# Patient Record
Sex: FEMALE | Race: ASIAN | NOT HISPANIC OR LATINO
[De-identification: names, ages, dates, MRNs, and addresses within clinical notes are randomized per-mention and may not be internally consistent; named-entity substitution may affect disease eponyms.]

---

## 2022-12-12 ENCOUNTER — APPOINTMENT (OUTPATIENT)
Dept: COLORECTAL SURGERY | Facility: CLINIC | Age: 27
End: 2022-12-12

## 2022-12-12 VITALS
SYSTOLIC BLOOD PRESSURE: 112 MMHG | HEIGHT: 61 IN | DIASTOLIC BLOOD PRESSURE: 74 MMHG | WEIGHT: 117 LBS | BODY MASS INDEX: 22.09 KG/M2 | HEART RATE: 83 BPM | TEMPERATURE: 98.2 F

## 2022-12-12 PROBLEM — Z00.00 ENCOUNTER FOR PREVENTIVE HEALTH EXAMINATION: Status: ACTIVE | Noted: 2022-12-12

## 2022-12-12 PROCEDURE — 99204 OFFICE O/P NEW MOD 45 MIN: CPT

## 2022-12-12 NOTE — HISTORY OF PRESENT ILLNESS
[FreeTextEntry1] : 28 y/o Mandarin speaking F presents for evaluation of appendicitis w/ abscess, referred by Dr. Grimm\par Denies PSH\par Never had a colonoscopy\par Denies FMH of CRC or IBD\par \par Pt presented to [facility] in [place], [place] on 12/3/22 c/o 3 days of abdominal pain associated w/ nausea abd some fevers. IV fluids given and started on IV Zosyn. ID and SURG consulted, non surgical.\par WBC mildly elevated at 12.39, neutrophils 81.3%, LFTs ~ 2x ULN (ALT/AST 65/74)\par CT 12/3/22:\par Preliminary read: Extensive inflammatory changes noted in RLQ, adjacent loops demonstrate wall thickening and distention w/ scattered air fluid levels. Wall of cecum thickened as well. There is a central area which appears more cystic within adjacent tubular structure.\par Findings\par Kidneys: Mild fullness of Right renal collecting system, likely r/t distal inflammatory changes\par Bowel: lack of oral contrast limits evaluation of bowel. Findings are suspicious for perforated acute appendicitis w/ multiloculated abscess in RLQ measuring approx 54x40 mm. There is probably 2/2 distal ileal enteritis and ileus of the more proximal ileum\par \par CT a/p completed  12/5/22:\par Kidneys: MIld right sided hydronephrosis is noted 2/2 to pelvic inflammation\par Bowel: Free fluid seen in the lower pelvis. Mild ileus noted. There is phlegmon and/or evolving abscess in the right lower quadrant of of abdomen. Appendix appears markedly abnormal and second measuring approx. 24 mm wall to wall. The size of phlegmon is ~53x50 mm not significantly different than seen previously. There is secondary wall thickening of the cecum and terminal ileum.\par \par Admitted for perforated appendix with intraabdominal abscess, IR consulted as well, no intervention completed. Labs normalized on 12/7 and pt discharged on 12/7/22 on cefdenir 300 mg BID and metronidazole 500 mg TID x 21 days. Advised to repeat CT scan next week. Recommended GEN SURG consult and GI consult for colonoscopy to r/o IBD or intraluminal mass prior to appendectomy\par \par No imaging scheduled.\par Seen by GIA Grimm on 12/9 and referred to this office for further evaluation\par No colonoscopy scheduled per patient\par \par Pt has been taking both medications and reports appetite and energy normal\par Denies abd pain, n/v/c/d or rectal bleeding\par Admits stools have been a bit looser and fall apart\par Drinks only one bottle of water daily\par \par

## 2022-12-12 NOTE — ASSESSMENT
[FreeTextEntry1] : I reviewed with the patient with a Chinese  that her work-up and evaluation is consistent with perforated appendicitis.  However the possibility of of an underlying terminal ileitis/inflammatory bowel disease remains to be considered given the findings of significant inflammation of the cecum and terminal ileum on CAT scan.  These findings of inflammation to be consistent with a secondary involvement from the perforated appendicitis or a primary inflammatory bowel disease.  Therefore, I have recommend that she proceed with a repeat CT scan in 4 weeks to assess resolution of this acute inflammation.  If resolution of the inflammation is identified then a colonoscopy with her primary GI would be appropriate including terminal ileal intubation and evaluation.  The risk, benefits and alternatives of the treatment plan have been outlined.  All questions answered.  After resolution of the inflammation and evaluation with colonoscopy and interval appendectomy will be scheduled.

## 2023-01-03 ENCOUNTER — RESULT REVIEW (OUTPATIENT)
Age: 28
End: 2023-01-03

## 2023-01-11 ENCOUNTER — OUTPATIENT (OUTPATIENT)
Dept: OUTPATIENT SERVICES | Facility: HOSPITAL | Age: 28
LOS: 1 days | End: 2023-01-11

## 2023-01-11 ENCOUNTER — APPOINTMENT (OUTPATIENT)
Dept: CT IMAGING | Facility: CLINIC | Age: 28
End: 2023-01-11
Payer: MEDICAID

## 2023-01-11 PROCEDURE — 74177 CT ABD & PELVIS W/CONTRAST: CPT | Mod: 26

## 2023-01-27 ENCOUNTER — NON-APPOINTMENT (OUTPATIENT)
Age: 28
End: 2023-01-27

## 2023-06-26 ENCOUNTER — NON-APPOINTMENT (OUTPATIENT)
Age: 28
End: 2023-06-26

## 2023-06-27 ENCOUNTER — RESULT REVIEW (OUTPATIENT)
Age: 28
End: 2023-06-27

## 2023-06-28 ENCOUNTER — INPATIENT (INPATIENT)
Facility: HOSPITAL | Age: 28
LOS: 2 days | Discharge: HOME | DRG: 373 | End: 2023-07-01
Attending: SURGERY | Admitting: SURGERY
Payer: COMMERCIAL

## 2023-06-28 ENCOUNTER — OUTPATIENT (OUTPATIENT)
Dept: OUTPATIENT SERVICES | Facility: HOSPITAL | Age: 28
LOS: 1 days | End: 2023-06-28

## 2023-06-28 ENCOUNTER — APPOINTMENT (OUTPATIENT)
Dept: CT IMAGING | Facility: CLINIC | Age: 28
End: 2023-06-28
Payer: COMMERCIAL

## 2023-06-28 VITALS
RESPIRATION RATE: 18 BRPM | DIASTOLIC BLOOD PRESSURE: 69 MMHG | WEIGHT: 115.08 LBS | HEIGHT: 61 IN | OXYGEN SATURATION: 99 % | HEART RATE: 79 BPM | TEMPERATURE: 98 F | SYSTOLIC BLOOD PRESSURE: 106 MMHG

## 2023-06-28 LAB
ALBUMIN SERPL ELPH-MCNC: 3.4 G/DL — SIGNIFICANT CHANGE UP (ref 3.4–5)
ALP SERPL-CCNC: 54 U/L — SIGNIFICANT CHANGE UP (ref 40–120)
ALT FLD-CCNC: 30 U/L — SIGNIFICANT CHANGE UP (ref 12–42)
ANION GAP SERPL CALC-SCNC: 11 MMOL/L — SIGNIFICANT CHANGE UP (ref 5–17)
ANION GAP SERPL CALC-SCNC: 8 MMOL/L — LOW (ref 9–16)
APTT BLD: 23.9 SEC — LOW (ref 27.5–35.5)
APTT BLD: 30 SEC — SIGNIFICANT CHANGE UP (ref 27.5–35.5)
AST SERPL-CCNC: 52 U/L — HIGH (ref 15–37)
BASOPHILS # BLD AUTO: 0.02 K/UL — SIGNIFICANT CHANGE UP (ref 0–0.2)
BASOPHILS NFR BLD AUTO: 0.2 % — SIGNIFICANT CHANGE UP (ref 0–2)
BILIRUB SERPL-MCNC: 0.5 MG/DL — SIGNIFICANT CHANGE UP (ref 0.2–1.2)
BLD GP AB SCN SERPL QL: NEGATIVE — SIGNIFICANT CHANGE UP
BUN SERPL-MCNC: 11 MG/DL — SIGNIFICANT CHANGE UP (ref 7–23)
BUN SERPL-MCNC: 8 MG/DL — SIGNIFICANT CHANGE UP (ref 7–23)
CALCIUM SERPL-MCNC: 8.6 MG/DL — SIGNIFICANT CHANGE UP (ref 8.4–10.5)
CALCIUM SERPL-MCNC: 8.9 MG/DL — SIGNIFICANT CHANGE UP (ref 8.5–10.5)
CHLORIDE SERPL-SCNC: 102 MMOL/L — SIGNIFICANT CHANGE UP (ref 96–108)
CHLORIDE SERPL-SCNC: 102 MMOL/L — SIGNIFICANT CHANGE UP (ref 96–108)
CO2 SERPL-SCNC: 24 MMOL/L — SIGNIFICANT CHANGE UP (ref 22–31)
CO2 SERPL-SCNC: 26 MMOL/L — SIGNIFICANT CHANGE UP (ref 22–31)
CREAT SERPL-MCNC: 0.42 MG/DL — LOW (ref 0.5–1.3)
CREAT SERPL-MCNC: 0.51 MG/DL — SIGNIFICANT CHANGE UP (ref 0.5–1.3)
EGFR: 130 ML/MIN/1.73M2 — SIGNIFICANT CHANGE UP
EGFR: 136 ML/MIN/1.73M2 — SIGNIFICANT CHANGE UP
EOSINOPHIL # BLD AUTO: 0.1 K/UL — SIGNIFICANT CHANGE UP (ref 0–0.5)
EOSINOPHIL NFR BLD AUTO: 0.8 % — SIGNIFICANT CHANGE UP (ref 0–6)
GLUCOSE SERPL-MCNC: 86 MG/DL — SIGNIFICANT CHANGE UP (ref 70–99)
GLUCOSE SERPL-MCNC: 97 MG/DL — SIGNIFICANT CHANGE UP (ref 70–99)
HCG SERPL-ACNC: <1 MIU/ML — SIGNIFICANT CHANGE UP
HCT VFR BLD CALC: 35.2 % — SIGNIFICANT CHANGE UP (ref 34.5–45)
HCT VFR BLD CALC: 35.9 % — SIGNIFICANT CHANGE UP (ref 34.5–45)
HGB BLD-MCNC: 11.7 G/DL — SIGNIFICANT CHANGE UP (ref 11.5–15.5)
HGB BLD-MCNC: 12.2 G/DL — SIGNIFICANT CHANGE UP (ref 11.5–15.5)
IMM GRANULOCYTES NFR BLD AUTO: 0.3 % — SIGNIFICANT CHANGE UP (ref 0–0.9)
INR BLD: 1.08 — SIGNIFICANT CHANGE UP (ref 0.88–1.16)
INR BLD: 1.13 — SIGNIFICANT CHANGE UP (ref 0.88–1.16)
LYMPHOCYTES # BLD AUTO: 1.93 K/UL — SIGNIFICANT CHANGE UP (ref 1–3.3)
LYMPHOCYTES # BLD AUTO: 15.7 % — SIGNIFICANT CHANGE UP (ref 13–44)
MAGNESIUM SERPL-MCNC: 2 MG/DL — SIGNIFICANT CHANGE UP (ref 1.6–2.6)
MCHC RBC-ENTMCNC: 32.1 PG — SIGNIFICANT CHANGE UP (ref 27–34)
MCHC RBC-ENTMCNC: 33.2 GM/DL — SIGNIFICANT CHANGE UP (ref 32–36)
MCHC RBC-ENTMCNC: 33.2 PG — SIGNIFICANT CHANGE UP (ref 27–34)
MCHC RBC-ENTMCNC: 34 GM/DL — SIGNIFICANT CHANGE UP (ref 32–36)
MCV RBC AUTO: 96.7 FL — SIGNIFICANT CHANGE UP (ref 80–100)
MCV RBC AUTO: 97.6 FL — SIGNIFICANT CHANGE UP (ref 80–100)
MONOCYTES # BLD AUTO: 0.64 K/UL — SIGNIFICANT CHANGE UP (ref 0–0.9)
MONOCYTES NFR BLD AUTO: 5.2 % — SIGNIFICANT CHANGE UP (ref 2–14)
NEUTROPHILS # BLD AUTO: 9.6 K/UL — HIGH (ref 1.8–7.4)
NEUTROPHILS NFR BLD AUTO: 77.8 % — HIGH (ref 43–77)
NRBC # BLD: 0 /100 WBCS — SIGNIFICANT CHANGE UP (ref 0–0)
NRBC # BLD: 0 /100 WBCS — SIGNIFICANT CHANGE UP (ref 0–0)
PHOSPHATE SERPL-MCNC: 3.2 MG/DL — SIGNIFICANT CHANGE UP (ref 2.5–4.5)
PLATELET # BLD AUTO: 239 K/UL — SIGNIFICANT CHANGE UP (ref 150–400)
PLATELET # BLD AUTO: 356 K/UL — SIGNIFICANT CHANGE UP (ref 150–400)
POTASSIUM SERPL-MCNC: 4.2 MMOL/L — SIGNIFICANT CHANGE UP (ref 3.5–5.3)
POTASSIUM SERPL-MCNC: 5.2 MMOL/L — SIGNIFICANT CHANGE UP (ref 3.5–5.3)
POTASSIUM SERPL-SCNC: 4.2 MMOL/L — SIGNIFICANT CHANGE UP (ref 3.5–5.3)
POTASSIUM SERPL-SCNC: 5.2 MMOL/L — SIGNIFICANT CHANGE UP (ref 3.5–5.3)
PROT SERPL-MCNC: 8.2 G/DL — SIGNIFICANT CHANGE UP (ref 6.4–8.2)
PROTHROM AB SERPL-ACNC: 12.6 SEC — SIGNIFICANT CHANGE UP (ref 10.5–13.4)
PROTHROM AB SERPL-ACNC: 13.5 SEC — HIGH (ref 10.5–13.4)
RBC # BLD: 3.64 M/UL — LOW (ref 3.8–5.2)
RBC # BLD: 3.68 M/UL — LOW (ref 3.8–5.2)
RBC # FLD: 11 % — SIGNIFICANT CHANGE UP (ref 10.3–14.5)
RBC # FLD: 11.1 % — SIGNIFICANT CHANGE UP (ref 10.3–14.5)
RH IG SCN BLD-IMP: POSITIVE — SIGNIFICANT CHANGE UP
RH IG SCN BLD-IMP: POSITIVE — SIGNIFICANT CHANGE UP
SODIUM SERPL-SCNC: 136 MMOL/L — SIGNIFICANT CHANGE UP (ref 132–145)
SODIUM SERPL-SCNC: 137 MMOL/L — SIGNIFICANT CHANGE UP (ref 135–145)
WBC # BLD: 11.37 K/UL — HIGH (ref 3.8–10.5)
WBC # BLD: 12.33 K/UL — HIGH (ref 3.8–10.5)
WBC # FLD AUTO: 11.37 K/UL — HIGH (ref 3.8–10.5)
WBC # FLD AUTO: 12.33 K/UL — HIGH (ref 3.8–10.5)

## 2023-06-28 PROCEDURE — 74177 CT ABD & PELVIS W/CONTRAST: CPT | Mod: 26

## 2023-06-28 PROCEDURE — 99285 EMERGENCY DEPT VISIT HI MDM: CPT

## 2023-06-28 RX ORDER — METRONIDAZOLE 500 MG
500 TABLET ORAL ONCE
Refills: 0 | Status: COMPLETED | OUTPATIENT
Start: 2023-06-28 | End: 2023-06-28

## 2023-06-28 RX ORDER — METRONIDAZOLE 500 MG
500 TABLET ORAL EVERY 8 HOURS
Refills: 0 | Status: DISCONTINUED | OUTPATIENT
Start: 2023-06-28 | End: 2023-06-30

## 2023-06-28 RX ORDER — CEFTRIAXONE 500 MG/1
1000 INJECTION, POWDER, FOR SOLUTION INTRAMUSCULAR; INTRAVENOUS ONCE
Refills: 0 | Status: COMPLETED | OUTPATIENT
Start: 2023-06-28 | End: 2023-06-28

## 2023-06-28 RX ORDER — HEPARIN SODIUM 5000 [USP'U]/ML
5000 INJECTION INTRAVENOUS; SUBCUTANEOUS ONCE
Refills: 0 | Status: COMPLETED | OUTPATIENT
Start: 2023-06-28 | End: 2023-06-28

## 2023-06-28 RX ORDER — ACETAMINOPHEN 500 MG
650 TABLET ORAL EVERY 6 HOURS
Refills: 0 | Status: DISCONTINUED | OUTPATIENT
Start: 2023-06-28 | End: 2023-07-01

## 2023-06-28 RX ORDER — CEFTRIAXONE 500 MG/1
1000 INJECTION, POWDER, FOR SOLUTION INTRAMUSCULAR; INTRAVENOUS EVERY 24 HOURS
Refills: 0 | Status: DISCONTINUED | OUTPATIENT
Start: 2023-06-29 | End: 2023-06-30

## 2023-06-28 RX ORDER — SODIUM CHLORIDE 9 MG/ML
1000 INJECTION, SOLUTION INTRAVENOUS
Refills: 0 | Status: DISCONTINUED | OUTPATIENT
Start: 2023-06-28 | End: 2023-07-01

## 2023-06-28 RX ADMIN — CEFTRIAXONE 1000 MILLIGRAM(S): 500 INJECTION, POWDER, FOR SOLUTION INTRAMUSCULAR; INTRAVENOUS at 12:30

## 2023-06-28 RX ADMIN — HEPARIN SODIUM 5000 UNIT(S): 5000 INJECTION INTRAVENOUS; SUBCUTANEOUS at 21:45

## 2023-06-28 RX ADMIN — Medication 100 MILLIGRAM(S): at 12:32

## 2023-06-28 RX ADMIN — CEFTRIAXONE 100 MILLIGRAM(S): 500 INJECTION, POWDER, FOR SOLUTION INTRAMUSCULAR; INTRAVENOUS at 11:57

## 2023-06-28 RX ADMIN — Medication 100 MILLIGRAM(S): at 21:45

## 2023-06-28 RX ADMIN — Medication 500 MILLIGRAM(S): at 13:39

## 2023-06-28 NOTE — ED PROVIDER NOTE - PHYSICAL EXAMINATION
CONSTITUTIONAL: Well-appearing; well-nourished; in no apparent distress.   	HEAD: Normocephalic; atraumatic.   	EYES:  conjunctiva and sclera clear  	ENT: normal nose; no rhinorrhea; normal pharynx with no erythema or lesions.   	NECK: Supple; non-tender;   	CARDIOVASCULAR: Normal S1, S2; no murmurs, rubs, or gallops. Regular rate and rhythm.   	RESPIRATORY: Breathing easily; breath sounds clear and equal bilaterally; no wheezes, rhonchi, or rales.  	GI: Soft; non-distended; RLQ tenderness. no guarding or rebound.   	EXT: PATTERSON x 4.   	SKIN: Normal for age and race; warm; dry; good turgor; no apparent lesions or rash.   	NEURO: A & O x 3; face symmetric; grossly unremarkable.   PSYCHOLOGICAL: The patient’s mood and manner are appropriate.

## 2023-06-28 NOTE — ED ADULT TRIAGE NOTE - CHIEF COMPLAINT QUOTE
Pt was sent from 5th floor outpatient radiology. Pt reports RLQ abdominal pain x 5 days. Denies fevers or n/v/d. Pt reports having a CT scan done today and is unsure of the results.

## 2023-06-28 NOTE — ED PROVIDER NOTE - OBJECTIVE STATEMENT
27 yo female sent to ED from upstairs radiology after positive CT abdomen findings reported today. Mandarin speaking, used language line. Patient reports she had perforated appendicitis with abscess in Dec 2022, admitted to [facility], had five days of IV antibiotics, no surgical intervention, was discharged home. patient had follow up with colorectal at [facility] January 2023 with improvement of symptoms and reassuring CT. Had colonoscopy afterwards as well that was "normal". She reached out to colorectal practice manager at [facility] today due to having 5 days of RLQ pain, the coordinator was able to set up outpatient imaging today. CT abdomen today shows 4cm pericecal abscess. She denies fever/chills/vomiting/diarrhea/melena or hematochezia. no personal hx or family of crohns or IBD. no previous abdominal surgeries. 29 yo female sent to ED from upstairs radiology after positive CT abdomen findings reported today. Mandarin speaking, used language line. Patient reports she had perforated appendicitis with abscess in Dec 2022, admitted to [facility], had five days of IV antibiotics, no surgical intervention, was discharged home. patient had follow up with colorectal at [facility] January 2023 with improvement of symptoms and reassuring CT. Had colonoscopy afterwards as well that was "normal". She reached out to Dr. Ryan Herrera's colorectal practice manager at [facility] today due to having 5 days of RLQ pain, the coordinator was able to set up outpatient imaging today. CT abdomen today shows 4cm pericecal abscess.  She denies fever/chills/vomiting/diarrhea/melena or hematochezia. no personal hx or family of crohns or IBD. no previous abdominal surgeries.

## 2023-06-28 NOTE — ED PROVIDER NOTE - CARE PLAN
1 Principal Discharge DX:	Pericecal abscess   impairments found/functional limitations in following categories

## 2023-06-28 NOTE — H&P ADULT - ASSESSMENT
29 y/o F w/ PMHx of perforated appendicitis (Dec 2022) and no PSHx presents as a transfer from [facility] for further management of pericecal abscess.    IR consult 6/29  CLD, NPO w/ MIVF @ midnight   Pain and nausea meds PRN   SQH (x1 dose)/SCDs/ 29 y/o F w/ PMHx of perforated appendicitis (Dec 2022) and no PSHx presents as a transfer from [facility] for further management of 4cm pericecal abscess.     IR consult 6/29  CLD, NPO w/ MIVF @ midnight   Pain and nausea meds PRN   SQH (x1 dose)/SCDs/IS   STAT  29 y/o F w/ PMHx of perforated appendicitis (Dec 2022) and no PSHx presents as a transfer from [facility] for further management of 4cm pericecal abscess.     IR consult 6/29  CLD, NPO w/ IVF @ midnight   Pain and nausea meds PRN   SQH (x1 dose)/SCDs/IS   8pm labs (w/ T&Sx2 and coags), AM labs  27 y/o F w/ PMHx of perforated appendicitis (Dec 2022) and no PSHx presents as a transfer from [facility] for further management of 4cm pericecal abscess.     IR consult 6/29  CLD, NPO w/ IVF @ midnight   IV ceftriaxone and flagyl   Pain and nausea meds PRN   SQH (x1 dose)/SCDs/IS   8pm labs (w/ T&Sx2 and coags), AM labs     Patient plan discussed with chief resident on call, Dr. Montoya

## 2023-06-28 NOTE — H&P ADULT - NSHPLABSRESULTS_GEN_ALL_CORE
WBC 12.3, H/H 12.2/35    CT A/P 6/28  BOWEL: Appendix is not seen. Pericecal complex rim-enhancing fluid   collection present with surrounding fat infiltration, 3.5 x 2.5 x 4 cm   (2:78), previously phlegmon measured up to 1.8 cm. Adjacent terminal   ileum demonstrate mild reactive inflammatory wall thickening. No   obstruction.

## 2023-06-28 NOTE — ED PROVIDER NOTE - CLINICAL SUMMARY MEDICAL DECISION MAKING FREE TEXT BOX
27 yo female with RLQ abd pain x 5 days, outpatient imaging shows 4cm pericecal abscess on CT, patient looks well, vss, IV antibiotics ordered. patient follows with Dr. Herrera colorectal [facility] surgery, spoke with his NP Jamia. Dr. Herrera is currently in the OR in a surgical case. Jamia spoke with Dr. Herrera who would patient to be admitted under his service as per Jamia. patient agrees with plan.

## 2023-06-28 NOTE — ED ADULT NURSE NOTE - NSFALLUNIVINTERV_ED_ALL_ED
Bed/Stretcher in lowest position, wheels locked, appropriate side rails in place/Call bell, personal items and telephone in reach/Instruct patient to call for assistance before getting out of bed/chair/stretcher/Non-slip footwear applied when patient is off stretcher/[place] to call system/Physically safe environment - no spills, clutter or unnecessary equipment/Purposeful proactive rounding/Room/bathroom lighting operational, light cord in reach

## 2023-06-28 NOTE — ED ADULT NURSE NOTE - OBJECTIVE STATEMENT
pt c/o of lower right abd pain x5 days. pt completed ct scan earlier today, pt verbalize pain with actively, denies any pain when idol. pt AOx4, able to verbalize needs ambulated with steady gait, denies n/v/d

## 2023-06-28 NOTE — H&P ADULT - HISTORY OF PRESENT ILLNESS
27 y/o F w/ PMHx of perforated appendicitis (Dec 2022) and no PSHx presents as a transfer from [facility] for further management of pericecal abscess. Patient originally was diagnosed with perforated appendicitis in Dec 2022 and was treated with IV antibiotics. Afterwards, she followed up with Dr. Herrera in January 2023. Her symptoms and repeat imaging had improved. 5 days ago, she began to had RLQ pain.  27 y/o F w/ PMHx of perforated appendicitis (Dec 2022) and no PSHx presents as a transfer from [facility] for further management of pericecal abscess. Patient originally was diagnosed with perforated appendicitis in Dec 2022 and was treated with IV antibiotics. Afterwards, she followed up with Dr. Herrera in January 2023. Her symptoms and repeat imaging had improved. 5 days ago, she began to had RLQ pain. She called Dr. Herrera's office and they arrived an outpatient CT scan. Due to CT showing a 4cm pericecal abscess, she was instructed to go to [facility]. She was given a dose of IV Ceftriaxone and Flagyl and directly admitted to [facility]. She describes the pain as tolerable. She denies taking pain medication at home to alleviate symptoms. The pain increases with walking. She denies associated symptoms of nausea, vomiting, fever, chills, constipation, diarrhea, and urinary symptoms. She denies family history of colon cancer, ulcerative colitis, Crohn's and IBS. She had a colonoscopy done 2 months ago with Dr. Plasencia ([facility]) and results showed a small internal hemorrhoid .  27 y/o F w/ PMHx of perforated appendicitis (Dec 2022) and no PSHx presents as a transfer from [facility] for further management of pericecal abscess. Patient originally was diagnosed with perforated appendicitis in Dec 2022 and was treated with IV antibiotics. Afterwards, she followed up with Dr. Herrera in January 2023. Her symptoms and repeat imaging had improved. 5 days ago, she began to had RLQ pain. She called Dr. Herrera's office and they ordered an outpatient CT scan. Due to CT showing a 4cm pericecal abscess, she was instructed to go to [facility]. She was given a dose of IV Ceftriaxone and Flagyl and directly admitted to [facility]. She describes the pain as tolerable. She denies taking pain medication at home to alleviate symptoms. The pain increases with walking. She denies associated symptoms of nausea, vomiting, fever, chills, constipation, diarrhea, and urinary symptoms. She denies family history of colon cancer, ulcerative colitis, Crohn's and IBS. She had a colonoscopy done 2 months ago with Dr. Plasencia ([facility]) and results showed a small internal hemorrhoid .  27 y/o F w/ PMHx of perforated appendicitis (Dec 2022) and no PSHx presents as a transfer from [facility] for further management of pericecal abscess. Patient originally was diagnosed with perforated appendicitis in Dec 2022 and was treated with IV antibiotics. Afterwards, she followed up with Dr. Herrera in January 2023. Her symptoms and repeat imaging had improved. 5 days ago, she began to had RLQ pain. She called Dr. Herrera's office and they ordered an outpatient CT scan. Due to CT showing a 4cm pericecal abscess, she was instructed to go to [facility]. She was given a dose of IV Ceftriaxone and Flagyl and directly admitted to [facility]. She describes the pain as tolerable. She denies taking pain medication at home to alleviate symptoms. The pain increases with walking. She denies associated symptoms of nausea, vomiting, fever, chills, constipation, diarrhea, and urinary symptoms. She denies family history of colon cancer, ulcerative colitis, Crohn's and IBS. She had a colonoscopy done 2 months ago with Dr. Plasencia ([facility]) and results showed a small internal hemorrhoid .     Mandarin  used: Miriam, [id number]

## 2023-06-28 NOTE — ED ADULT NURSE NOTE - ED STAT RN HANDOFF DETAILS
report given to EMS at transfer, pt vss, ambulatory to stretcher without difficulty. pt stable for transfer.

## 2023-06-28 NOTE — H&P ADULT - NSHPPHYSICALEXAM_GEN_ALL_CORE
General: NAD, resting comfortably in bed.  C/V: NSR.  Pulm: Nonlabored breathing, no respiratory distress on RA.  Abd: soft, RLQ tenderness, non-distended.   Extrem: WWP, no edema, SCDs in place.  Neuro: motor/sensory grossly intact, moves all ext . to command and spontaneously.

## 2023-06-29 LAB
ANION GAP SERPL CALC-SCNC: 10 MMOL/L — SIGNIFICANT CHANGE UP (ref 5–17)
BLD GP AB SCN SERPL QL: NEGATIVE — SIGNIFICANT CHANGE UP
BUN SERPL-MCNC: 7 MG/DL — SIGNIFICANT CHANGE UP (ref 7–23)
CALCIUM SERPL-MCNC: 8.2 MG/DL — LOW (ref 8.4–10.5)
CHLORIDE SERPL-SCNC: 104 MMOL/L — SIGNIFICANT CHANGE UP (ref 96–108)
CO2 SERPL-SCNC: 22 MMOL/L — SIGNIFICANT CHANGE UP (ref 22–31)
CREAT SERPL-MCNC: 0.49 MG/DL — LOW (ref 0.5–1.3)
EGFR: 131 ML/MIN/1.73M2 — SIGNIFICANT CHANGE UP
GLUCOSE SERPL-MCNC: 84 MG/DL — SIGNIFICANT CHANGE UP (ref 70–99)
HCT VFR BLD CALC: 37 % — SIGNIFICANT CHANGE UP (ref 34.5–45)
HGB BLD-MCNC: 11.8 G/DL — SIGNIFICANT CHANGE UP (ref 11.5–15.5)
MAGNESIUM SERPL-MCNC: 1.9 MG/DL — SIGNIFICANT CHANGE UP (ref 1.6–2.6)
MCHC RBC-ENTMCNC: 31.9 GM/DL — LOW (ref 32–36)
MCHC RBC-ENTMCNC: 32.3 PG — SIGNIFICANT CHANGE UP (ref 27–34)
MCV RBC AUTO: 101.4 FL — HIGH (ref 80–100)
NRBC # BLD: 0 /100 WBCS — SIGNIFICANT CHANGE UP (ref 0–0)
PHOSPHATE SERPL-MCNC: 3.5 MG/DL — SIGNIFICANT CHANGE UP (ref 2.5–4.5)
PLATELET # BLD AUTO: 197 K/UL — SIGNIFICANT CHANGE UP (ref 150–400)
POTASSIUM SERPL-MCNC: 3.7 MMOL/L — SIGNIFICANT CHANGE UP (ref 3.5–5.3)
POTASSIUM SERPL-MCNC: SIGNIFICANT CHANGE UP MMOL/L (ref 3.5–5.3)
POTASSIUM SERPL-SCNC: 3.7 MMOL/L — SIGNIFICANT CHANGE UP (ref 3.5–5.3)
POTASSIUM SERPL-SCNC: SIGNIFICANT CHANGE UP MMOL/L (ref 3.5–5.3)
RBC # BLD: 3.65 M/UL — LOW (ref 3.8–5.2)
RBC # FLD: 10.9 % — SIGNIFICANT CHANGE UP (ref 10.3–14.5)
SODIUM SERPL-SCNC: 136 MMOL/L — SIGNIFICANT CHANGE UP (ref 135–145)
WBC # BLD: 7.91 K/UL — SIGNIFICANT CHANGE UP (ref 3.8–10.5)
WBC # FLD AUTO: 7.91 K/UL — SIGNIFICANT CHANGE UP (ref 3.8–10.5)

## 2023-06-29 PROCEDURE — 99222 1ST HOSP IP/OBS MODERATE 55: CPT | Mod: GC

## 2023-06-29 RX ORDER — HEPARIN SODIUM 5000 [USP'U]/ML
5000 INJECTION INTRAVENOUS; SUBCUTANEOUS EVERY 8 HOURS
Refills: 0 | Status: DISCONTINUED | OUTPATIENT
Start: 2023-06-29 | End: 2023-07-01

## 2023-06-29 RX ORDER — POTASSIUM CHLORIDE 20 MEQ
40 PACKET (EA) ORAL ONCE
Refills: 0 | Status: COMPLETED | OUTPATIENT
Start: 2023-06-29 | End: 2023-06-29

## 2023-06-29 RX ADMIN — Medication 40 MILLIEQUIVALENT(S): at 18:51

## 2023-06-29 RX ADMIN — Medication 100 MILLIGRAM(S): at 14:15

## 2023-06-29 RX ADMIN — Medication 100 MILLIGRAM(S): at 05:23

## 2023-06-29 RX ADMIN — HEPARIN SODIUM 5000 UNIT(S): 5000 INJECTION INTRAVENOUS; SUBCUTANEOUS at 18:31

## 2023-06-29 RX ADMIN — Medication 100 MILLIGRAM(S): at 22:06

## 2023-06-29 RX ADMIN — CEFTRIAXONE 100 MILLIGRAM(S): 500 INJECTION, POWDER, FOR SOLUTION INTRAMUSCULAR; INTRAVENOUS at 12:00

## 2023-06-29 RX ADMIN — SODIUM CHLORIDE 100 MILLILITER(S): 9 INJECTION, SOLUTION INTRAVENOUS at 01:07

## 2023-06-29 NOTE — CONSULT NOTE ADULT - SUBJECTIVE AND OBJECTIVE BOX
27 y/o F w/ PMHx of perforated appendicitis (Dec 2022) and no PSHx presents as a transfer from [facility] for further management of pericecal abscess. Patient originally was diagnosed with perforated appendicitis in Dec 2022 and was treated with IV antibiotics. Afterwards, she followed up with Dr. Herrera in January 2023. Her symptoms and repeat imaging had improved. 5 days ago, she began to had RLQ pain. She called Dr. Herrera's office and they ordered an outpatient CT scan. Due to CT showing a 4cm pericecal abscess, she was instructed to go to [facility], transferred to [facility] for management. IR consulted for RLQ abscess drainage.     Clinical History: ABDOMINAL PAIN    No pertinent family history in first degree relatives    Handoff    MEWS Score    Perforated appendicitis    Pericecal abscess    No significant past surgical history    ABDOMINAL PAIN    [email or web address]        Meds:acetaminophen     Tablet .. 650 milliGRAM(s) Oral every 6 hours PRN  cefTRIAXone   IVPB 1000 milliGRAM(s) IV Intermittent every 24 hours  lactated ringers. 1000 milliLiter(s) IV Continuous <Continuous>  metroNIDAZOLE  IVPB 500 milliGRAM(s) IV Intermittent every 8 hours      Allergies:No Known Allergies        Labs:                           11.8   7.91  )-----------( 197      ( 29 Jun 2023 05:30 )             37.0     PT/INR - ( 28 Jun 2023 20:30 )   PT: 13.5 sec;   INR: 1.13          PTT - ( 28 Jun 2023 20:30 )  PTT:30.0 sec  06-29    136  |  104  |  7   ----------------------------<  84  see note   |  22  |  0.49<L>    Ca    8.2<L>      29 Jun 2023 05:30  Phos  3.5     06-29  Mg     1.9     06-29    TPro  8.2  /  Alb  3.4  /  TBili  0.5  /  DBili  x   /  AST  52<H>  /  ALT  30  /  AlkPhos  54  06-28          Imaging Findings: Pericecal abscess 4 cm, increased since January 2023.

## 2023-06-29 NOTE — CONSULT NOTE ADULT - ASSESSMENT
Assessment: 29 y/o F w/ PMHx of perforated appendicitis (Dec 2022) and no PSHx presents as a transfer from [facility] for further management of pericecal abscess. IR consulted for RLQ abscess drainage. Case reviewed with Dr. Rodas, collection not amenable to percutaneous drainage no safe window. No plan for IR intervention at this time. Please reconsult as needed.     Communicated with: Charity CALVERT

## 2023-06-30 LAB
ANION GAP SERPL CALC-SCNC: 12 MMOL/L — SIGNIFICANT CHANGE UP (ref 5–17)
BUN SERPL-MCNC: 6 MG/DL — LOW (ref 7–23)
CALCIUM SERPL-MCNC: 8.5 MG/DL — SIGNIFICANT CHANGE UP (ref 8.4–10.5)
CHLORIDE SERPL-SCNC: 108 MMOL/L — SIGNIFICANT CHANGE UP (ref 96–108)
CO2 SERPL-SCNC: 21 MMOL/L — LOW (ref 22–31)
CREAT SERPL-MCNC: 0.45 MG/DL — LOW (ref 0.5–1.3)
EGFR: 133 ML/MIN/1.73M2 — SIGNIFICANT CHANGE UP
GLUCOSE SERPL-MCNC: 100 MG/DL — HIGH (ref 70–99)
HCT VFR BLD CALC: 33.8 % — LOW (ref 34.5–45)
HGB BLD-MCNC: 11.3 G/DL — LOW (ref 11.5–15.5)
MAGNESIUM SERPL-MCNC: 1.9 MG/DL — SIGNIFICANT CHANGE UP (ref 1.6–2.6)
MCHC RBC-ENTMCNC: 32.4 PG — SIGNIFICANT CHANGE UP (ref 27–34)
MCHC RBC-ENTMCNC: 33.4 GM/DL — SIGNIFICANT CHANGE UP (ref 32–36)
MCV RBC AUTO: 96.8 FL — SIGNIFICANT CHANGE UP (ref 80–100)
NRBC # BLD: 0 /100 WBCS — SIGNIFICANT CHANGE UP (ref 0–0)
PHOSPHATE SERPL-MCNC: 3.1 MG/DL — SIGNIFICANT CHANGE UP (ref 2.5–4.5)
PLATELET # BLD AUTO: 269 K/UL — SIGNIFICANT CHANGE UP (ref 150–400)
POTASSIUM SERPL-MCNC: 4.1 MMOL/L — SIGNIFICANT CHANGE UP (ref 3.5–5.3)
POTASSIUM SERPL-SCNC: 4.1 MMOL/L — SIGNIFICANT CHANGE UP (ref 3.5–5.3)
RBC # BLD: 3.49 M/UL — LOW (ref 3.8–5.2)
RBC # FLD: 10.8 % — SIGNIFICANT CHANGE UP (ref 10.3–14.5)
SODIUM SERPL-SCNC: 141 MMOL/L — SIGNIFICANT CHANGE UP (ref 135–145)
WBC # BLD: 9.04 K/UL — SIGNIFICANT CHANGE UP (ref 3.8–10.5)
WBC # FLD AUTO: 9.04 K/UL — SIGNIFICANT CHANGE UP (ref 3.8–10.5)

## 2023-06-30 PROCEDURE — 99232 SBSQ HOSP IP/OBS MODERATE 35: CPT | Mod: GC

## 2023-06-30 RX ORDER — METRONIDAZOLE 500 MG
500 TABLET ORAL EVERY 8 HOURS
Refills: 0 | Status: DISCONTINUED | OUTPATIENT
Start: 2023-07-01 | End: 2023-07-01

## 2023-06-30 RX ORDER — CEFPODOXIME PROXETIL 100 MG
200 TABLET ORAL EVERY 12 HOURS
Refills: 0 | Status: DISCONTINUED | OUTPATIENT
Start: 2023-07-01 | End: 2023-07-01

## 2023-06-30 RX ORDER — METRONIDAZOLE 500 MG
500 TABLET ORAL ONCE
Refills: 0 | Status: COMPLETED | OUTPATIENT
Start: 2023-06-30 | End: 2023-06-30

## 2023-06-30 RX ADMIN — Medication 100 MILLIGRAM(S): at 14:13

## 2023-06-30 RX ADMIN — HEPARIN SODIUM 5000 UNIT(S): 5000 INJECTION INTRAVENOUS; SUBCUTANEOUS at 02:24

## 2023-06-30 RX ADMIN — SODIUM CHLORIDE 100 MILLILITER(S): 9 INJECTION, SOLUTION INTRAVENOUS at 08:44

## 2023-06-30 RX ADMIN — Medication 100 MILLIGRAM(S): at 05:45

## 2023-06-30 RX ADMIN — CEFTRIAXONE 100 MILLIGRAM(S): 500 INJECTION, POWDER, FOR SOLUTION INTRAMUSCULAR; INTRAVENOUS at 11:04

## 2023-06-30 RX ADMIN — HEPARIN SODIUM 5000 UNIT(S): 5000 INJECTION INTRAVENOUS; SUBCUTANEOUS at 11:04

## 2023-06-30 RX ADMIN — HEPARIN SODIUM 5000 UNIT(S): 5000 INJECTION INTRAVENOUS; SUBCUTANEOUS at 18:35

## 2023-06-30 RX ADMIN — Medication 100 MILLIGRAM(S): at 21:51

## 2023-06-30 RX ADMIN — SODIUM CHLORIDE 100 MILLILITER(S): 9 INJECTION, SOLUTION INTRAVENOUS at 18:34

## 2023-07-01 ENCOUNTER — TRANSCRIPTION ENCOUNTER (OUTPATIENT)
Age: 28
End: 2023-07-01

## 2023-07-01 VITALS
TEMPERATURE: 98 F | HEART RATE: 71 BPM | DIASTOLIC BLOOD PRESSURE: 60 MMHG | OXYGEN SATURATION: 100 % | SYSTOLIC BLOOD PRESSURE: 92 MMHG | RESPIRATION RATE: 18 BRPM

## 2023-07-01 LAB
ANION GAP SERPL CALC-SCNC: 8 MMOL/L — SIGNIFICANT CHANGE UP (ref 5–17)
BUN SERPL-MCNC: 6 MG/DL — LOW (ref 7–23)
CALCIUM SERPL-MCNC: 8.9 MG/DL — SIGNIFICANT CHANGE UP (ref 8.4–10.5)
CHLORIDE SERPL-SCNC: 103 MMOL/L — SIGNIFICANT CHANGE UP (ref 96–108)
CO2 SERPL-SCNC: 25 MMOL/L — SIGNIFICANT CHANGE UP (ref 22–31)
CREAT SERPL-MCNC: 0.59 MG/DL — SIGNIFICANT CHANGE UP (ref 0.5–1.3)
EGFR: 125 ML/MIN/1.73M2 — SIGNIFICANT CHANGE UP
GLUCOSE SERPL-MCNC: 109 MG/DL — HIGH (ref 70–99)
HCT VFR BLD CALC: 35.4 % — SIGNIFICANT CHANGE UP (ref 34.5–45)
HGB BLD-MCNC: 11.8 G/DL — SIGNIFICANT CHANGE UP (ref 11.5–15.5)
MAGNESIUM SERPL-MCNC: 1.9 MG/DL — SIGNIFICANT CHANGE UP (ref 1.6–2.6)
MCHC RBC-ENTMCNC: 32.2 PG — SIGNIFICANT CHANGE UP (ref 27–34)
MCHC RBC-ENTMCNC: 33.3 GM/DL — SIGNIFICANT CHANGE UP (ref 32–36)
MCV RBC AUTO: 96.7 FL — SIGNIFICANT CHANGE UP (ref 80–100)
NRBC # BLD: 0 /100 WBCS — SIGNIFICANT CHANGE UP (ref 0–0)
PHOSPHATE SERPL-MCNC: 3.7 MG/DL — SIGNIFICANT CHANGE UP (ref 2.5–4.5)
PLATELET # BLD AUTO: 275 K/UL — SIGNIFICANT CHANGE UP (ref 150–400)
POTASSIUM SERPL-MCNC: 4.5 MMOL/L — SIGNIFICANT CHANGE UP (ref 3.5–5.3)
POTASSIUM SERPL-SCNC: 4.5 MMOL/L — SIGNIFICANT CHANGE UP (ref 3.5–5.3)
RBC # BLD: 3.66 M/UL — LOW (ref 3.8–5.2)
RBC # FLD: 11 % — SIGNIFICANT CHANGE UP (ref 10.3–14.5)
SODIUM SERPL-SCNC: 136 MMOL/L — SIGNIFICANT CHANGE UP (ref 135–145)
WBC # BLD: 6.12 K/UL — SIGNIFICANT CHANGE UP (ref 3.8–10.5)
WBC # FLD AUTO: 6.12 K/UL — SIGNIFICANT CHANGE UP (ref 3.8–10.5)

## 2023-07-01 PROCEDURE — 83735 ASSAY OF MAGNESIUM: CPT

## 2023-07-01 PROCEDURE — 84100 ASSAY OF PHOSPHORUS: CPT

## 2023-07-01 PROCEDURE — 84132 ASSAY OF SERUM POTASSIUM: CPT

## 2023-07-01 PROCEDURE — 85027 COMPLETE CBC AUTOMATED: CPT

## 2023-07-01 PROCEDURE — 86901 BLOOD TYPING SEROLOGIC RH(D): CPT

## 2023-07-01 PROCEDURE — 86900 BLOOD TYPING SEROLOGIC ABO: CPT

## 2023-07-01 PROCEDURE — 96374 THER/PROPH/DIAG INJ IV PUSH: CPT

## 2023-07-01 PROCEDURE — 85610 PROTHROMBIN TIME: CPT

## 2023-07-01 PROCEDURE — 85025 COMPLETE CBC W/AUTO DIFF WBC: CPT

## 2023-07-01 PROCEDURE — 80053 COMPREHEN METABOLIC PANEL: CPT

## 2023-07-01 PROCEDURE — 84702 CHORIONIC GONADOTROPIN TEST: CPT

## 2023-07-01 PROCEDURE — 99285 EMERGENCY DEPT VISIT HI MDM: CPT | Mod: 25

## 2023-07-01 PROCEDURE — 80048 BASIC METABOLIC PNL TOTAL CA: CPT

## 2023-07-01 PROCEDURE — 85730 THROMBOPLASTIN TIME PARTIAL: CPT

## 2023-07-01 PROCEDURE — 86850 RBC ANTIBODY SCREEN: CPT

## 2023-07-01 PROCEDURE — 36415 COLL VENOUS BLD VENIPUNCTURE: CPT

## 2023-07-01 RX ORDER — CEFPODOXIME PROXETIL 100 MG
1 TABLET ORAL
Qty: 24 | Refills: 0
Start: 2023-07-01 | End: 2023-07-12

## 2023-07-01 RX ORDER — ACETAMINOPHEN 500 MG
2 TABLET ORAL
Qty: 0 | Refills: 0 | DISCHARGE
Start: 2023-07-01

## 2023-07-01 RX ORDER — MAGNESIUM SULFATE 500 MG/ML
1 VIAL (ML) INJECTION ONCE
Refills: 0 | Status: COMPLETED | OUTPATIENT
Start: 2023-07-01 | End: 2023-07-01

## 2023-07-01 RX ORDER — METRONIDAZOLE 500 MG
1 TABLET ORAL
Qty: 36 | Refills: 0
Start: 2023-07-01 | End: 2023-07-12

## 2023-07-01 RX ADMIN — Medication 100 GRAM(S): at 09:05

## 2023-07-01 RX ADMIN — Medication 200 MILLIGRAM(S): at 11:17

## 2023-07-01 RX ADMIN — HEPARIN SODIUM 5000 UNIT(S): 5000 INJECTION INTRAVENOUS; SUBCUTANEOUS at 09:05

## 2023-07-01 RX ADMIN — HEPARIN SODIUM 5000 UNIT(S): 5000 INJECTION INTRAVENOUS; SUBCUTANEOUS at 01:06

## 2023-07-01 RX ADMIN — SODIUM CHLORIDE 100 MILLILITER(S): 9 INJECTION, SOLUTION INTRAVENOUS at 05:29

## 2023-07-01 RX ADMIN — Medication 500 MILLIGRAM(S): at 05:29

## 2023-07-01 RX ADMIN — Medication 500 MILLIGRAM(S): at 12:39

## 2023-07-01 NOTE — DISCHARGE NOTE PROVIDER - NSDCMRMEDTOKEN_GEN_ALL_CORE_FT
acetaminophen 325 mg oral tablet: 2 tab(s) orally every 6 hours As needed Mild Pain (1 - 3), Moderate Pain (4 - 6), Severe Pain (7 - 10)  cefpodoxime 200 mg oral tablet: 1 tab(s) orally every 12 hours MDD: 2  metroNIDAZOLE 500 mg oral tablet: 1 tab(s) orally every 8 hours MDD: 3

## 2023-07-01 NOTE — DISCHARGE NOTE PROVIDER - HOSPITAL COURSE
29 y/o F w/ PMHx of perforated appendicitis (Dec 2022) and no PSHx presented as a transfer from [facility] for further management of pericecal abscess. Patient originally was diagnosed with perforated appendicitis in Dec 2022 and was treated with IV antibiotics. Afterwards, she followed up with Dr. Herrera in January 2023. Her symptoms and repeat imaging had improved. 5 days ago, she began to had RLQ pain. She called Dr. Herrera's office and they ordered an outpatient CT scan. Due to CT showing a 4cm pericecal abscess, she was instructed to go to [facility]. She was given a dose of IV Ceftriaxone and Flagyl and directly admitted to [facility]. She described the pain as tolerable. She denied taking pain medication at home to alleviate symptoms. The pain increases with walking. She denied associated symptoms of nausea, vomiting, fever, chills, constipation, diarrhea, and urinary symptoms. She denied family history of colon cancer, ulcerative colitis, Crohn's and IBS. She had a colonoscopy done 2 months ago with Dr. Plasencia ([facility]) and results showed a small internal hemorrhoid . Per IR, no safe window could be obtained and patient was managed conservatively with IV abx. On discharge, patient was tolerating diet, denied pain, and will continue oral antibiotics for a total 14 day course.

## 2023-07-01 NOTE — PROGRESS NOTE ADULT - SUBJECTIVE AND OBJECTIVE BOX
SUBJECTIVE:  Patient seen this AM at bedside with chief resident. Patient feels well this AM and wants to go home. Denies nausea, vomiting, and pain at this time.     MEDICATIONS  (STANDING):  cefpodoxime 200 milliGRAM(s) Oral every 12 hours  heparin   Injectable 5000 Unit(s) SubCutaneous every 8 hours  metroNIDAZOLE    Tablet 500 milliGRAM(s) Oral every 8 hours    MEDICATIONS  (PRN):  acetaminophen     Tablet .. 650 milliGRAM(s) Oral every 6 hours PRN Mild Pain (1 - 3), Moderate Pain (4 - 6), Severe Pain (7 - 10)      Vital Signs Last 24 Hrs  T(C): 36.6 (01 Jul 2023 09:05), Max: 37.7 (30 Jun 2023 13:19)  T(F): 97.9 (01 Jul 2023 09:05), Max: 99.9 (30 Jun 2023 13:19)  HR: 71 (01 Jul 2023 09:05) (71 - 89)  BP: 92/60 (01 Jul 2023 09:05) (90/61 - 104/70)  BP(mean): --  RR: 18 (01 Jul 2023 09:05) (16 - 18)  SpO2: 100% (01 Jul 2023 09:05) (96% - 100%)    Parameters below as of 01 Jul 2023 09:05  Patient On (Oxygen Delivery Method): room air        Physical Exam:  General: NAD, resting comfortably in bed  Pulmonary: Nonlabored breathing, no respiratory distress  Cardiovascular: NSR  Abdominal: soft, appropriately tender  Extremities: WWP, normal strength  Neuro: A/O x 3, CNs II-XII grossly intact, no focal deficits, normal motor/sensation  Pulses: palpable distal pulses    I&O's Summary    30 Jun 2023 07:01  -  01 Jul 2023 07:00  --------------------------------------------------------  IN: 3320 mL / OUT: 650 mL / NET: 2670 mL    01 Jul 2023 07:01  -  01 Jul 2023 10:05  --------------------------------------------------------  IN: 440 mL / OUT: 0 mL / NET: 440 mL        LABS:                        11.8   6.12  )-----------( 275      ( 01 Jul 2023 06:35 )             35.4     07-01    136  |  103  |  6<L>  ----------------------------<  109<H>  4.5   |  25  |  0.59    Ca    8.9      01 Jul 2023 06:35  Phos  3.7     07-01  Mg     1.9     07-01        Urinalysis Basic - ( 01 Jul 2023 06:35 )    Color: x / Appearance: x / SG: x / pH: x  Gluc: 109 mg/dL / Ketone: x  / Bili: x / Urobili: x   Blood: x / Protein: x / Nitrite: x   Leuk Esterase: x / RBC: x / WBC x   Sq Epi: x / Non Sq Epi: x / Bacteria: x      CAPILLARY BLOOD GLUCOSE            RADIOLOGY & ADDITIONAL STUDIES:  
INTERVAL HPI/OVERNIGHT EVENTS: hal LFD. -N/-V.     SUBJECTIVE: Pt seen and examined at bedside this am by surgery team. No acute complaints. +F/+BMs. Tolerating diet, pain well controlled. Denies f/n/v/cp/sob.    MEDICATIONS  (STANDING):  cefTRIAXone   IVPB 1000 milliGRAM(s) IV Intermittent every 24 hours  heparin   Injectable 5000 Unit(s) SubCutaneous every 8 hours  lactated ringers. 1000 milliLiter(s) (100 mL/Hr) IV Continuous <Continuous>  metroNIDAZOLE  IVPB 500 milliGRAM(s) IV Intermittent every 8 hours    MEDICATIONS  (PRN):  acetaminophen     Tablet .. 650 milliGRAM(s) Oral every 6 hours PRN Mild Pain (1 - 3), Moderate Pain (4 - 6), Severe Pain (7 - 10)    Vital Signs Last 24 Hrs  T(C): 36.8 (30 Jun 2023 08:35), Max: 37.8 (29 Jun 2023 20:25)  T(F): 98.2 (30 Jun 2023 08:35), Max: 100 (29 Jun 2023 20:25)  HR: 67 (30 Jun 2023 08:35) (60 - 85)  BP: 97/66 (30 Jun 2023 08:35) (92/63 - 103/69)  BP(mean): --  RR: 18 (30 Jun 2023 08:35) (17 - 18)  SpO2: 100% (30 Jun 2023 08:35) (98% - 100%)    Parameters below as of 30 Jun 2023 08:35  Patient On (Oxygen Delivery Method): room air    PHYSICAL EXAM:    Constitutional: A&Ox3, NAD    Respiratory: non labored breathing, no respiratory distress    Cardiovascular: NSR, RRR    Gastrointestinal: abdomen soft, nd, ttp to RLQ, no rebound or guarding    Extremities: wwp, no calf tenderness or edema. SCDs in place     I&O's Detail    29 Jun 2023 07:01  -  30 Jun 2023 07:00  --------------------------------------------------------  IN:    IV PiggyBack: 200 mL    Lactated Ringers: 2200 mL  Total IN: 2400 mL    OUT:    Voided (mL): 900 mL  Total OUT: 900 mL    Total NET: 1500 mL      30 Jun 2023 07:01  -  30 Jun 2023 12:48  --------------------------------------------------------  IN:    IV PiggyBack: 50 mL    Lactated Ringers: 350 mL    Oral Fluid: 480 mL  Total IN: 880 mL    OUT:    Voided (mL): 350 mL  Total OUT: 350 mL    Total NET: 530 mL          LABS:                        11.3   9.04  )-----------( 269      ( 30 Jun 2023 05:30 )             33.8     06-30    141  |  108  |  6<L>  ----------------------------<  100<H>  4.1   |  21<L>  |  0.45<L>    Ca    8.5      30 Jun 2023 05:30  Phos  3.1     06-30  Mg     1.9     06-30      PT/INR - ( 28 Jun 2023 20:30 )   PT: 13.5 sec;   INR: 1.13          PTT - ( 28 Jun 2023 20:30 )  PTT:30.0 sec  Urinalysis Basic - ( 30 Jun 2023 05:30 )    Color: x / Appearance: x / SG: x / pH: x  Gluc: 100 mg/dL / Ketone: x  / Bili: x / Urobili: x   Blood: x / Protein: x / Nitrite: x   Leuk Esterase: x / RBC: x / WBC x   Sq Epi: x / Non Sq Epi: x / Bacteria: x        RADIOLOGY & ADDITIONAL STUDIES:
INTERVAL HPI/OVERNIGHT EVENTS: direct admit from [facility]. started on IV abx. SQHx1 dose. NPO @ MN.     SUBJECTIVE: Pt seen and examined at bedside this am by surgery team. No acute complaints. Pain well controlled. Denies f/n/v/cp/sob.    MEDICATIONS  (STANDING):  cefTRIAXone   IVPB 1000 milliGRAM(s) IV Intermittent every 24 hours  lactated ringers. 1000 milliLiter(s) (100 mL/Hr) IV Continuous <Continuous>  metroNIDAZOLE  IVPB 500 milliGRAM(s) IV Intermittent every 8 hours    MEDICATIONS  (PRN):  acetaminophen     Tablet .. 650 milliGRAM(s) Oral every 6 hours PRN Mild Pain (1 - 3), Moderate Pain (4 - 6), Severe Pain (7 - 10)      Vital Signs Last 24 Hrs  T(C): 36.9 (29 Jun 2023 13:10), Max: 37.1 (29 Jun 2023 00:26)  T(F): 98.5 (29 Jun 2023 13:10), Max: 98.8 (29 Jun 2023 00:26)  HR: 60 (29 Jun 2023 13:10) (59 - 76)  BP: 92/63 (29 Jun 2023 13:10) (91/60 - 98/66)  BP(mean): --  RR: 18 (29 Jun 2023 13:10) (17 - 18)  SpO2: 98% (29 Jun 2023 13:10) (96% - 99%)    Parameters below as of 29 Jun 2023 13:10  Patient On (Oxygen Delivery Method): room air    PHYSICAL EXAM:    Constitutional: A&Ox3, NAD    Respiratory: non labored breathing, no respiratory distress    Cardiovascular: NSR, RRR    Gastrointestinal: abdomen soft, nd, ttp to RLQ, no rebound or guarding    Extremities: wwp, no calf tenderness or edema. SCDs in place     I&O's Detail    28 Jun 2023 07:01  -  29 Jun 2023 07:00  --------------------------------------------------------  IN:    IV PiggyBack: 200 mL    Lactated Ringers: 700 mL  Total IN: 900 mL    OUT:    Voided (mL): 400 mL  Total OUT: 400 mL    Total NET: 500 mL      29 Jun 2023 07:01  -  29 Jun 2023 15:03  --------------------------------------------------------  IN:    Lactated Ringers: 800 mL  Total IN: 800 mL    OUT:    Voided (mL): 400 mL  Total OUT: 400 mL    Total NET: 400 mL          LABS:                        11.8   7.91  )-----------( 197      ( 29 Jun 2023 05:30 )             37.0     06-29    x   |  x   |  x   ----------------------------<  x   3.7   |  x   |  x     Ca    8.2<L>      29 Jun 2023 05:30  Phos  3.5     06-29  Mg     1.9     06-29    TPro  8.2  /  Alb  3.4  /  TBili  0.5  /  DBili  x   /  AST  52<H>  /  ALT  30  /  AlkPhos  54  06-28    PT/INR - ( 28 Jun 2023 20:30 )   PT: 13.5 sec;   INR: 1.13          PTT - ( 28 Jun 2023 20:30 )  PTT:30.0 sec  Urinalysis Basic - ( 29 Jun 2023 05:30 )    Color: x / Appearance: x / SG: x / pH: x  Gluc: 84 mg/dL / Ketone: x  / Bili: x / Urobili: x   Blood: x / Protein: x / Nitrite: x   Leuk Esterase: x / RBC: x / WBC x   Sq Epi: x / Non Sq Epi: x / Bacteria: x        RADIOLOGY & ADDITIONAL STUDIES:

## 2023-07-01 NOTE — DISCHARGE NOTE PROVIDER - CARE PROVIDER_API CALL
Ryan Herrera  Surgery  [facility] [facility]  [place] [address]  Phone: [phone]  Fax: [phone]  Follow Up Time: 2 weeks    Dakotah Hyde  Internal Medicine  N  Carlos [facility],    Phone: [phone]  Fax: ([phone]  Follow Up Time: Routine

## 2023-07-01 NOTE — DISCHARGE NOTE PROVIDER - PROVIDER TOKENS
PROVIDER:[TOKEN:[[id number]],FOLLOWUP:[2 weeks]],PROVIDER:[TOKEN:[[id number]],FOLLOWUP:[Routine]]

## 2023-07-01 NOTE — DISCHARGE NOTE PROVIDER - NSDCFUADDINST_GEN_ALL_CORE_FT
Warning Signs:  Please call your doctor or nurse practitioner if you experience the following:  *You experience new chest pain, pressure, squeezing or tightness.  *New or worsening cough, shortness of breath, or wheeze.  *If you are vomiting and cannot keep down fluids or your medications.  *You are getting dehydrated due to continued vomiting, diarrhea, or other reasons. Signs of dehydration include dry mouth, rapid heartbeat, or feeling dizzy or faint when standing.  *You see blood or dark/black material when you vomit or have a bowel movement.  *You experience burning when you urinate, have blood in your urine, or experience a discharge.  *Your pain is not improving within 8-12 hours or is not gone within 24 hours. Call or return immediately if your pain is getting worse, changes location, or moves to your chest or back.  *You have shaking chills, or fever greater than 101.5 degrees Fahrenheit or 38 degrees Celsius.  *Any change in your symptoms, or any new symptoms that concern you.      You have been prescribed antibiotics, please take the prescribed dose for a total of 12 days.

## 2023-07-01 NOTE — DISCHARGE NOTE NURSING/CASE MANAGEMENT/SOCIAL WORK - PATIENT PORTAL LINK FT
You can access the FollowMyHealth Patient Portal offered by [facility] by registering at the following website: [email or web address]. By joining [facility]’s FollowMyHealth portal, you will also be able to view your health information using other applications (apps) compatible with our system.

## 2023-07-01 NOTE — PROGRESS NOTE ADULT - ASSESSMENT
29 y/o F w/ PMHx of perforated appendicitis (Dec 2022) and no PSHx presents as a transfer from [facility] for further management of 4cm pericecal abscess.     CLD, NPO w/ IVF @ midnight   Pain/nausea control prn  Ceftriaxone/Flagyl   SCDs/holding HSQ for IR  IR consult 6/29
27 y/o F w/ PMHx of perforated appendicitis (Dec 2022) and no PSHx presents as a transfer from [facility] for further management of 4cm pericecal abscess.     LFD, IVF d/c  IV Abx switched to PO, Discharge plan on PO abx for a full 14 day course  Pain/nausea control prn  Ceftriaxone/Flagyl   SCDs/HSQ  Discharge today  
27 y/o F w/ PMHx of perforated appendicitis (Dec 2022) and no PSHx presents as a transfer from [facility] for further management of 4cm pericecal abscess.     LFD/IVF  Pain/nausea control prn  Ceftriaxone/Flagyl   SCDs/HSQ  Dispo planning 7/1 w/ PO abx

## 2023-07-01 NOTE — DISCHARGE NOTE PROVIDER - NSDCCPCAREPLAN_GEN_ALL_CORE_FT
PRINCIPAL DISCHARGE DIAGNOSIS  Diagnosis: Pericecal abscess  Assessment and Plan of Treatment: Management with antibiotics

## 2023-07-07 DIAGNOSIS — K35.33 ACUTE APPENDICITIS WITH PERFORATION, LOCALIZED PERITONITIS, AND GANGRENE, WITH ABSCESS: ICD-10-CM

## 2023-07-07 DIAGNOSIS — F17.290 NICOTINE DEPENDENCE, OTHER TOBACCO PRODUCT, UNCOMPLICATED: ICD-10-CM

## 2023-07-07 DIAGNOSIS — K64.8 OTHER HEMORRHOIDS: ICD-10-CM

## 2023-07-19 PROBLEM — K35.32 ACUTE APPENDICITIS WITH PERFORATION, LOCALIZED PERITONITIS, AND GANGRENE, WITHOUT ABSCESS: Chronic | Status: ACTIVE | Noted: 2023-06-28

## 2023-07-28 DIAGNOSIS — Z87.891 PERSONAL HISTORY OF NICOTINE DEPENDENCE: ICD-10-CM

## 2023-07-31 ENCOUNTER — TRANSCRIPTION ENCOUNTER (OUTPATIENT)
Age: 28
End: 2023-07-31

## 2023-07-31 ENCOUNTER — APPOINTMENT (OUTPATIENT)
Dept: COLORECTAL SURGERY | Facility: CLINIC | Age: 28
End: 2023-07-31
Payer: COMMERCIAL

## 2023-07-31 VITALS
HEIGHT: 61 IN | TEMPERATURE: 98.1 F | SYSTOLIC BLOOD PRESSURE: 108 MMHG | HEART RATE: 80 BPM | DIASTOLIC BLOOD PRESSURE: 75 MMHG | BODY MASS INDEX: 21.71 KG/M2 | WEIGHT: 115 LBS

## 2023-07-31 PROCEDURE — 99215 OFFICE O/P EST HI 40 MIN: CPT

## 2023-07-31 NOTE — HISTORY OF PRESENT ILLNESS
[FreeTextEntry1] : 27 y/o Mandarin speaking F presents for follow up evaluation on recent ED admission of pericecal abscess H/o perforated appendicitis 12/2022, medically treated with IV abx  Last colonoscopy performed at [facility] with Gi Dr. Aliyah Jerome on 3/15/23, small internal hemorrhoids. Tortuous colon. Otherwise unremarkable exam of the entire colon and terminal ileum.   Pt called office 6/26 c/o intermittent right sided abdominal pain, but overall felt well at time of conversation. Given h/o perforated appencitis, CT scan and labs ordered.  CBC drawn 6/27/23, WBC 12.7, neutrophils 9.9,   CT A/P performed 6/28/23 Impression:  Pericecal abscess 4 cm, increased since January 2023. Findings were discussed with Dr. BLANKA WILLS 6/28/2023 10:20 AM by Dr. Mancini with read back confirmation. Patient was sent to the ED.  Patient transferred from [facility] to [facility] 6/28-7/1/23 for further management of pericecal abscess. Patient c/o RLQ pain x 1 week. Pt called Dr. Herrera's office and pt advised to complete CT scan for further evaluation. Findings revealed 4 cm pericecal abscess, pt instructed to go to [facility]. On arrival pt give 1 dose of IV Ceftriaxone and Flagyl. Pt transferred to [facility] admitted to surgery.   IR consulted, no safe window could be obtained, pt was managed conservatively with IV abx and dc'd on 14-day course of abx.   Pt completed abx and currently feels well. Appetite and energy good, denies fever or chills.  Denies abd pain, n/v/c/d Moves bowels once daily, soft to occasionally looser. Denies rectal bleeding

## 2023-07-31 NOTE — PHYSICAL EXAM
[Abdomen Masses] : No abdominal masses [Abdomen Tenderness] : ~T No ~M abdominal tenderness [No HSM] : no hepatosplenomegaly [JVD] : no jugular venous distention  [Normal Breath Sounds] : Normal breath sounds [Normal Heart Sounds] : normal heart sounds none

## 2023-07-31 NOTE — ASSESSMENT
[FreeTextEntry1] : Advised interval Laproscpic appendectomy-  possible open procedure.  The risks and befits of the treatment plan were reviewed and outlined with the patient. The patient understands the associated risks of the involved treatment and wishes to proceed. All questions were answered and explained.  We will obtain CT scan to assess resolution of the abscess prior to surgery.  A chinese  was utilized for the entire visit . All questions were addressed.

## 2023-08-29 ENCOUNTER — OUTPATIENT (OUTPATIENT)
Dept: OUTPATIENT SERVICES | Facility: HOSPITAL | Age: 28
LOS: 1 days | End: 2023-08-29

## 2023-08-29 ENCOUNTER — APPOINTMENT (OUTPATIENT)
Dept: CT IMAGING | Facility: CLINIC | Age: 28
End: 2023-08-29
Payer: COMMERCIAL

## 2023-08-29 PROCEDURE — 74177 CT ABD & PELVIS W/CONTRAST: CPT | Mod: 26

## 2023-09-06 ENCOUNTER — TRANSCRIPTION ENCOUNTER (OUTPATIENT)
Age: 28
End: 2023-09-06

## 2023-09-06 RX ORDER — INFLUENZA VIRUS VACCINE 15; 15; 15; 15 UG/.5ML; UG/.5ML; UG/.5ML; UG/.5ML
0.5 SUSPENSION INTRAMUSCULAR ONCE
Refills: 0 | Status: DISCONTINUED | OUTPATIENT
Start: 2023-09-07 | End: 2023-09-09

## 2023-09-06 NOTE — PATIENT PROFILE ADULT - FALL HARM RISK - UNIVERSAL INTERVENTIONS
Bed in lowest position, wheels locked, appropriate side rails in place/Call bell, personal items and telephone in reach/Instruct patient to call for assistance before getting out of bed or chair/Non-slip footwear when patient is out of bed/[place] to call system/Physically safe environment - no spills, clutter or unnecessary equipment/Purposeful Proactive Rounding/Room/bathroom lighting operational, light cord in reach

## 2023-09-07 ENCOUNTER — INPATIENT (INPATIENT)
Facility: HOSPITAL | Age: 28
LOS: 1 days | Discharge: HOME | DRG: 330 | End: 2023-09-09
Attending: SURGERY | Admitting: SURGERY
Payer: COMMERCIAL

## 2023-09-07 ENCOUNTER — APPOINTMENT (OUTPATIENT)
Dept: COLORECTAL SURGERY | Facility: HOSPITAL | Age: 28
End: 2023-09-07

## 2023-09-07 ENCOUNTER — TRANSCRIPTION ENCOUNTER (OUTPATIENT)
Age: 28
End: 2023-09-07

## 2023-09-07 ENCOUNTER — RESULT REVIEW (OUTPATIENT)
Age: 28
End: 2023-09-07

## 2023-09-07 VITALS
WEIGHT: 120.15 LBS | OXYGEN SATURATION: 97 % | SYSTOLIC BLOOD PRESSURE: 97 MMHG | TEMPERATURE: 98 F | HEIGHT: 61 IN | HEART RATE: 67 BPM | DIASTOLIC BLOOD PRESSURE: 67 MMHG | RESPIRATION RATE: 16 BRPM

## 2023-09-07 DIAGNOSIS — K35.33 ACUTE APPENDICITIS WITH PERFORATION, LOCALIZED PERITONITIS, AND GANGRENE, WITH ABSCESS: ICD-10-CM

## 2023-09-07 DIAGNOSIS — E83.39 OTHER DISORDERS OF PHOSPHORUS METABOLISM: ICD-10-CM

## 2023-09-07 DIAGNOSIS — Q43.8 OTHER SPECIFIED CONGENITAL MALFORMATIONS OF INTESTINE: ICD-10-CM

## 2023-09-07 PROCEDURE — 88307 TISSUE EXAM BY PATHOLOGIST: CPT | Mod: 26

## 2023-09-07 PROCEDURE — 44205 LAP COLECTOMY PART W/ILEUM: CPT | Mod: GC

## 2023-09-07 DEVICE — STAPLER ETHICON PROXIMATE 75MM WITH BLUE RELOAD: Type: IMPLANTABLE DEVICE | Site: RIGHT | Status: FUNCTIONAL

## 2023-09-07 DEVICE — CLIP APPLIER ETHICON LIGACLIP 11.5" MEDIUM: Type: IMPLANTABLE DEVICE | Site: RIGHT | Status: FUNCTIONAL

## 2023-09-07 DEVICE — STAPLER COVIDIEN TRI-STAPLE 60MM PURPLE RELOAD: Type: IMPLANTABLE DEVICE | Site: RIGHT | Status: FUNCTIONAL

## 2023-09-07 RX ORDER — KETOROLAC TROMETHAMINE 30 MG/ML
15 SYRINGE (ML) INJECTION EVERY 6 HOURS
Refills: 0 | Status: DISCONTINUED | OUTPATIENT
Start: 2023-09-07 | End: 2023-09-08

## 2023-09-07 RX ORDER — HEPARIN SODIUM 5000 [USP'U]/ML
5000 INJECTION INTRAVENOUS; SUBCUTANEOUS ONCE
Refills: 0 | Status: DISCONTINUED | OUTPATIENT
Start: 2023-09-07 | End: 2023-09-07

## 2023-09-07 RX ORDER — SODIUM CHLORIDE 9 MG/ML
1000 INJECTION, SOLUTION INTRAVENOUS
Refills: 0 | Status: DISCONTINUED | OUTPATIENT
Start: 2023-09-07 | End: 2023-09-09

## 2023-09-07 RX ORDER — HEPARIN SODIUM 5000 [USP'U]/ML
5000 INJECTION INTRAVENOUS; SUBCUTANEOUS EVERY 8 HOURS
Refills: 0 | Status: DISCONTINUED | OUTPATIENT
Start: 2023-09-07 | End: 2023-09-09

## 2023-09-07 RX ORDER — ACETAMINOPHEN 500 MG
1000 TABLET ORAL EVERY 6 HOURS
Refills: 0 | Status: DISCONTINUED | OUTPATIENT
Start: 2023-09-07 | End: 2023-09-09

## 2023-09-07 RX ORDER — HYDROMORPHONE HYDROCHLORIDE 2 MG/ML
0.2 INJECTION INTRAMUSCULAR; INTRAVENOUS; SUBCUTANEOUS EVERY 8 HOURS
Refills: 0 | Status: DISCONTINUED | OUTPATIENT
Start: 2023-09-07 | End: 2023-09-08

## 2023-09-07 RX ADMIN — HYDROMORPHONE HYDROCHLORIDE 0.2 MILLIGRAM(S): 2 INJECTION INTRAMUSCULAR; INTRAVENOUS; SUBCUTANEOUS at 18:25

## 2023-09-07 RX ADMIN — HEPARIN SODIUM 5000 UNIT(S): 5000 INJECTION INTRAVENOUS; SUBCUTANEOUS at 21:25

## 2023-09-07 RX ADMIN — Medication 1000 MILLIGRAM(S): at 21:25

## 2023-09-07 RX ADMIN — HYDROMORPHONE HYDROCHLORIDE 0.2 MILLIGRAM(S): 2 INJECTION INTRAMUSCULAR; INTRAVENOUS; SUBCUTANEOUS at 18:10

## 2023-09-07 NOTE — BRIEF OPERATIVE NOTE - OPERATION/FINDINGS
Entry via open Kamron technique. Assist ports placed under direct visualization. With blunt dissection appendix identified. Cecum noted to be adherent and inflamed. Decision made to proceed with ileocolic resection. R colon mobilized laterally. R ureter identified. Ileocolic artery skeletonized and ligated with ligasure device. Medial dissection done and duodenum pushed down. Once R colon adequately mobilized 6cm off midline RLQ incision made and wound protector placed. Specimen brought up. Proximal and distal margins identified and mesentery divided with Ligasure device. Enterotomy made in each limb and Common channel made with EndoGIA purple load x2. Staple line hemostasis secured with 3'0 vicryl. Common enterotomy closed with endo DEANA stapler and specimen passed off. Closing tray used for closure. Fascia closed with PDS #1 and skin closed with deep dermal 3'0 vicryl and 4'0 monocryl.

## 2023-09-07 NOTE — PROGRESS NOTE ADULT - SUBJECTIVE AND OBJECTIVE BOX
General Surgery Post op Check    Pt seen and examined without complaints. Pain is controlled. Patient pressures soft(90, otherwise VSS). Denies SOB/CP/N/V.     Vital Signs Last 24 Hrs  T(C): 36.9 (08 Sep 2023 01:47), Max: 37.1 (07 Sep 2023 16:23)  T(F): 98.5 (08 Sep 2023 01:47), Max: 98.7 (07 Sep 2023 16:23)  HR: 65 (08 Sep 2023 01:47) (60 - 79)  BP: 97/60 (08 Sep 2023 01:47) (93/55 - 104/53)  BP(mean): 69 (07 Sep 2023 19:53) (68 - 75)  RR: 17 (08 Sep 2023 01:47) (13 - 22)  SpO2: 98% (08 Sep 2023 01:47) (97% - 100%)    Parameters below as of 08 Sep 2023 01:47  Patient On (Oxygen Delivery Method): room air        I&O's Summary    07 Sep 2023 07:01  -  08 Sep 2023 04:11  --------------------------------------------------------  IN: 1440 mL / OUT: 1163 mL / NET: 277 mL        Physical Exam  Gen: NAD, A&Ox3  Pulm: No respiratory distress, no subcostal retractions  CV: RRR, no JVD  Abd: Soft, NT, ND  Extremities:  FROM, warm and well perfused, equal bilateral muscle strength      28F, Mandarin speaking, w/ hx of admison for perforated appendicitis wiht abscess in Dec 2022 (treated with abx as IR could not drain the 4cm abscess found on scan) presents for elective interval appendectomy, now s/p laparoscopic right hemicolectomy    Pain/nausea control  CLD/IVF  SQH/SCDs  marisol

## 2023-09-07 NOTE — H&P ADULT - HISTORY OF PRESENT ILLNESS
27 y/o Mandarin speaking F seen for histoyr of recurrent perforated appendicitis.     H/o perforated appendicitis 12/2022, medically treated with IV abx    Last colonoscopy performed at [facility] with Gi Dr. Aliyah Jerome on 3/15/23, small internal hemorrhoids. Tortuous colon. Otherwise unremarkable exam of the entire colon and terminal ileum.     Pt was seen again on 6/26 c/o intermittent right sided abdominal pain. CT scan and labs ordered. CT A/P performed 6/28/23 Pericecal abscess 4 cm not amenable to IR drainage and treated with Antibiotics,     She is here today for laparoscopic appendectomy possible hemicolectomy.

## 2023-09-07 NOTE — H&P ADULT - ASSESSMENT
28 f with history of recurrent perforated appendicitis. Here today for laparoscopic appendectomy possible right hemicolectomy.

## 2023-09-08 LAB
ANION GAP SERPL CALC-SCNC: 9 MMOL/L — SIGNIFICANT CHANGE UP (ref 5–17)
BILIRUB SERPL-MCNC: 0.5 MG/DL — SIGNIFICANT CHANGE UP (ref 0.2–1.2)
BUN SERPL-MCNC: 6 MG/DL — LOW (ref 7–23)
CALCIUM SERPL-MCNC: 8.8 MG/DL — SIGNIFICANT CHANGE UP (ref 8.4–10.5)
CHLORIDE SERPL-SCNC: 101 MMOL/L — SIGNIFICANT CHANGE UP (ref 96–108)
CO2 SERPL-SCNC: 23 MMOL/L — SIGNIFICANT CHANGE UP (ref 22–31)
CREAT SERPL-MCNC: 0.49 MG/DL — LOW (ref 0.5–1.3)
CREAT SERPL-MCNC: 0.54 MG/DL — SIGNIFICANT CHANGE UP (ref 0.5–1.3)
EGFR: 128 ML/MIN/1.73M2 — SIGNIFICANT CHANGE UP
EGFR: 131 ML/MIN/1.73M2 — SIGNIFICANT CHANGE UP
GLUCOSE SERPL-MCNC: 106 MG/DL — HIGH (ref 70–99)
HCT VFR BLD CALC: 38.6 % — SIGNIFICANT CHANGE UP (ref 34.5–45)
HGB BLD-MCNC: 12.5 G/DL — SIGNIFICANT CHANGE UP (ref 11.5–15.5)
INR BLD: 1.03 — SIGNIFICANT CHANGE UP (ref 0.85–1.18)
MAGNESIUM SERPL-MCNC: 2.1 MG/DL — SIGNIFICANT CHANGE UP (ref 1.6–2.6)
MCHC RBC-ENTMCNC: 32.4 GM/DL — SIGNIFICANT CHANGE UP (ref 32–36)
MCHC RBC-ENTMCNC: 33.1 PG — SIGNIFICANT CHANGE UP (ref 27–34)
MCV RBC AUTO: 102.1 FL — HIGH (ref 80–100)
MELD SCORE WITH DIALYSIS: 20 POINTS — SIGNIFICANT CHANGE UP
MELD SCORE WITHOUT DIALYSIS: 7 POINTS — SIGNIFICANT CHANGE UP
NRBC # BLD: 0 /100 WBCS — SIGNIFICANT CHANGE UP (ref 0–0)
PHOSPHATE SERPL-MCNC: 3.9 MG/DL — SIGNIFICANT CHANGE UP (ref 2.5–4.5)
PLATELET # BLD AUTO: 284 K/UL — SIGNIFICANT CHANGE UP (ref 150–400)
POTASSIUM SERPL-MCNC: 4.3 MMOL/L — SIGNIFICANT CHANGE UP (ref 3.5–5.3)
POTASSIUM SERPL-SCNC: 4.3 MMOL/L — SIGNIFICANT CHANGE UP (ref 3.5–5.3)
PROTHROM AB SERPL-ACNC: 11.7 SEC — SIGNIFICANT CHANGE UP (ref 9.5–13)
RBC # BLD: 3.78 M/UL — LOW (ref 3.8–5.2)
RBC # FLD: 11.8 % — SIGNIFICANT CHANGE UP (ref 10.3–14.5)
SODIUM SERPL-SCNC: 133 MMOL/L — LOW (ref 135–145)
SODIUM SERPL-SCNC: 137 MMOL/L — SIGNIFICANT CHANGE UP (ref 135–145)
WBC # BLD: 15.36 K/UL — HIGH (ref 3.8–10.5)
WBC # FLD AUTO: 15.36 K/UL — HIGH (ref 3.8–10.5)

## 2023-09-08 RX ORDER — KETOROLAC TROMETHAMINE 30 MG/ML
15 SYRINGE (ML) INJECTION EVERY 6 HOURS
Refills: 0 | Status: DISCONTINUED | OUTPATIENT
Start: 2023-09-08 | End: 2023-09-09

## 2023-09-08 RX ORDER — HYDROMORPHONE HYDROCHLORIDE 2 MG/ML
1 INJECTION INTRAMUSCULAR; INTRAVENOUS; SUBCUTANEOUS EVERY 6 HOURS
Refills: 0 | Status: DISCONTINUED | OUTPATIENT
Start: 2023-09-08 | End: 2023-09-09

## 2023-09-08 RX ORDER — HYDROMORPHONE HYDROCHLORIDE 2 MG/ML
0.5 INJECTION INTRAMUSCULAR; INTRAVENOUS; SUBCUTANEOUS EVERY 6 HOURS
Refills: 0 | Status: DISCONTINUED | OUTPATIENT
Start: 2023-09-08 | End: 2023-09-09

## 2023-09-08 RX ADMIN — Medication 1000 MILLIGRAM(S): at 21:36

## 2023-09-08 RX ADMIN — HEPARIN SODIUM 5000 UNIT(S): 5000 INJECTION INTRAVENOUS; SUBCUTANEOUS at 13:29

## 2023-09-08 RX ADMIN — Medication 15 MILLIGRAM(S): at 23:44

## 2023-09-08 RX ADMIN — HEPARIN SODIUM 5000 UNIT(S): 5000 INJECTION INTRAVENOUS; SUBCUTANEOUS at 21:37

## 2023-09-08 RX ADMIN — HEPARIN SODIUM 5000 UNIT(S): 5000 INJECTION INTRAVENOUS; SUBCUTANEOUS at 05:54

## 2023-09-08 RX ADMIN — Medication 15 MILLIGRAM(S): at 18:56

## 2023-09-08 RX ADMIN — Medication 1000 MILLIGRAM(S): at 09:39

## 2023-09-08 NOTE — PROGRESS NOTE ADULT - SUBJECTIVE AND OBJECTIVE BOX
SUBJECTIVE:      MEDICATIONS  (STANDING):  acetaminophen     Tablet .. 1000 milliGRAM(s) Oral every 6 hours  heparin   Injectable 5000 Unit(s) SubCutaneous every 8 hours  influenza   Vaccine 0.5 milliLiter(s) IntraMuscular once  lactated ringers. 1000 milliLiter(s) (40 mL/Hr) IV Continuous <Continuous>    MEDICATIONS  (PRN):  HYDROmorphone  Injectable 0.2 milliGRAM(s) IV Push every 8 hours PRN Severe Pain (7 - 10)  ketorolac   Injectable 15 milliGRAM(s) IV Push every 6 hours PRN Moderate Pain (4 - 6)      Vital Signs Last 24 Hrs  T(C): 37.1 (08 Sep 2023 05:16), Max: 37.1 (07 Sep 2023 16:23)  T(F): 98.8 (08 Sep 2023 05:16), Max: 98.8 (08 Sep 2023 05:16)  HR: 75 (08 Sep 2023 05:16) (60 - 79)  BP: 94/60 (08 Sep 2023 05:16) (93/55 - 104/53)  BP(mean): 69 (07 Sep 2023 19:53) (68 - 75)  RR: 17 (08 Sep 2023 05:16) (13 - 22)  SpO2: 98% (08 Sep 2023 05:16) (97% - 100%)    Parameters below as of 08 Sep 2023 05:16  Patient On (Oxygen Delivery Method): room air        Physical Exam:  General: NAD, resting comfortably in bed  Pulmonary: Nonlabored breathing, no respiratory distress  Cardiovascular: NSR  Abdominal: soft, NT/ND  Extremities: WWP, normal strength  Neuro: A/O x 3, CNs II-XII grossly intact, no focal deficits    I&O's Summary    07 Sep 2023 07:01  -  08 Sep 2023 07:00  --------------------------------------------------------  IN: 1560 mL / OUT: 1463 mL / NET: 97 mL        LABS:                        12.5   15.36 )-----------( 284      ( 08 Sep 2023 05:30 )             38.6               CAPILLARY BLOOD GLUCOSE            RADIOLOGY & ADDITIONAL STUDIES:   SUBJECTIVE:  Pt seen at bedside this AM. Pt has no complaints o/n. Pt tolerating PO w/ CLD; denies nausea/vomiting.     MEDICATIONS  (STANDING):  acetaminophen     Tablet .. 1000 milliGRAM(s) Oral every 6 hours  heparin   Injectable 5000 Unit(s) SubCutaneous every 8 hours  influenza   Vaccine 0.5 milliLiter(s) IntraMuscular once  lactated ringers. 1000 milliLiter(s) (40 mL/Hr) IV Continuous <Continuous>    MEDICATIONS  (PRN):  HYDROmorphone  Injectable 0.2 milliGRAM(s) IV Push every 8 hours PRN Severe Pain (7 - 10)  ketorolac   Injectable 15 milliGRAM(s) IV Push every 6 hours PRN Moderate Pain (4 - 6)      Vital Signs Last 24 Hrs  T(C): 37.1 (08 Sep 2023 05:16), Max: 37.1 (07 Sep 2023 16:23)  T(F): 98.8 (08 Sep 2023 05:16), Max: 98.8 (08 Sep 2023 05:16)  HR: 75 (08 Sep 2023 05:16) (60 - 79)  BP: 94/60 (08 Sep 2023 05:16) (93/55 - 104/53)  BP(mean): 69 (07 Sep 2023 19:53) (68 - 75)  RR: 17 (08 Sep 2023 05:16) (13 - 22)  SpO2: 98% (08 Sep 2023 05:16) (97% - 100%)    Parameters below as of 08 Sep 2023 05:16  Patient On (Oxygen Delivery Method): room air        Physical Exam:  General: NAD, resting comfortably in bed  Pulmonary: Nonlabored breathing, no respiratory distress  Cardiovascular: NSR  Abdominal: soft, ttp yasir-incisional, ND  Extremities: WWP, normal strength  Neuro: A/O x 3, CNs II-XII grossly intact, no focal deficits    I&O's Summary    07 Sep 2023 07:01  -  08 Sep 2023 07:00  --------------------------------------------------------  IN: 1560 mL / OUT: 1463 mL / NET: 97 mL        LABS:                        12.5   15.36 )-----------( 284      ( 08 Sep 2023 05:30 )             38.6               CAPILLARY BLOOD GLUCOSE            RADIOLOGY & ADDITIONAL STUDIES:

## 2023-09-08 NOTE — PROGRESS NOTE ADULT - ASSESSMENT
28F, Mandarin speaking, w/ hx of admison for perforated appendicitis wiht abscess in Dec 2022 (treated with abx as IR could not drain the 4cm abscess found on scan) presents for elective interval appendectomy, now s/p laparoscopic right hemicolectomy    Pain/nausea control  CLD/IVF  SQH/SCDs  damon

## 2023-09-09 ENCOUNTER — TRANSCRIPTION ENCOUNTER (OUTPATIENT)
Age: 28
End: 2023-09-09

## 2023-09-09 VITALS
HEART RATE: 80 BPM | RESPIRATION RATE: 17 BRPM | DIASTOLIC BLOOD PRESSURE: 65 MMHG | SYSTOLIC BLOOD PRESSURE: 96 MMHG | TEMPERATURE: 99 F | OXYGEN SATURATION: 98 %

## 2023-09-09 LAB
ANION GAP SERPL CALC-SCNC: 9 MMOL/L — SIGNIFICANT CHANGE UP (ref 5–17)
BUN SERPL-MCNC: 6 MG/DL — LOW (ref 7–23)
CALCIUM SERPL-MCNC: 8.8 MG/DL — SIGNIFICANT CHANGE UP (ref 8.4–10.5)
CHLORIDE SERPL-SCNC: 105 MMOL/L — SIGNIFICANT CHANGE UP (ref 96–108)
CO2 SERPL-SCNC: 24 MMOL/L — SIGNIFICANT CHANGE UP (ref 22–31)
CREAT SERPL-MCNC: 0.5 MG/DL — SIGNIFICANT CHANGE UP (ref 0.5–1.3)
EGFR: 130 ML/MIN/1.73M2 — SIGNIFICANT CHANGE UP
GLUCOSE SERPL-MCNC: 95 MG/DL — SIGNIFICANT CHANGE UP (ref 70–99)
HCT VFR BLD CALC: 34.2 % — LOW (ref 34.5–45)
HGB BLD-MCNC: 11.1 G/DL — LOW (ref 11.5–15.5)
MAGNESIUM SERPL-MCNC: 1.9 MG/DL — SIGNIFICANT CHANGE UP (ref 1.6–2.6)
MCHC RBC-ENTMCNC: 32.5 GM/DL — SIGNIFICANT CHANGE UP (ref 32–36)
MCHC RBC-ENTMCNC: 32.9 PG — SIGNIFICANT CHANGE UP (ref 27–34)
MCV RBC AUTO: 101.5 FL — HIGH (ref 80–100)
NRBC # BLD: 0 /100 WBCS — SIGNIFICANT CHANGE UP (ref 0–0)
PHOSPHATE SERPL-MCNC: 2.1 MG/DL — LOW (ref 2.5–4.5)
PLATELET # BLD AUTO: 249 K/UL — SIGNIFICANT CHANGE UP (ref 150–400)
POTASSIUM SERPL-MCNC: 4.1 MMOL/L — SIGNIFICANT CHANGE UP (ref 3.5–5.3)
POTASSIUM SERPL-SCNC: 4.1 MMOL/L — SIGNIFICANT CHANGE UP (ref 3.5–5.3)
RBC # BLD: 3.37 M/UL — LOW (ref 3.8–5.2)
RBC # FLD: 11.9 % — SIGNIFICANT CHANGE UP (ref 10.3–14.5)
SODIUM SERPL-SCNC: 138 MMOL/L — SIGNIFICANT CHANGE UP (ref 135–145)
WBC # BLD: 10.29 K/UL — SIGNIFICANT CHANGE UP (ref 3.8–10.5)
WBC # FLD AUTO: 10.29 K/UL — SIGNIFICANT CHANGE UP (ref 3.8–10.5)

## 2023-09-09 PROCEDURE — 83735 ASSAY OF MAGNESIUM: CPT

## 2023-09-09 PROCEDURE — C1889: CPT

## 2023-09-09 PROCEDURE — 84100 ASSAY OF PHOSPHORUS: CPT

## 2023-09-09 PROCEDURE — 85027 COMPLETE CBC AUTOMATED: CPT

## 2023-09-09 PROCEDURE — 80048 BASIC METABOLIC PNL TOTAL CA: CPT

## 2023-09-09 PROCEDURE — 88307 TISSUE EXAM BY PATHOLOGIST: CPT

## 2023-09-09 PROCEDURE — 36415 COLL VENOUS BLD VENIPUNCTURE: CPT

## 2023-09-09 RX ORDER — DOCUSATE SODIUM 100 MG
1 CAPSULE ORAL
Qty: 15 | Refills: 0
Start: 2023-09-09 | End: 2023-09-23

## 2023-09-09 RX ORDER — SODIUM,POTASSIUM PHOSPHATES 278-250MG
1 POWDER IN PACKET (EA) ORAL ONCE
Refills: 0 | Status: COMPLETED | OUTPATIENT
Start: 2023-09-09 | End: 2023-09-09

## 2023-09-09 RX ADMIN — Medication 15 MILLIGRAM(S): at 13:10

## 2023-09-09 RX ADMIN — Medication 15 MILLIGRAM(S): at 05:43

## 2023-09-09 RX ADMIN — Medication 1 PACKET(S): at 13:09

## 2023-09-09 RX ADMIN — Medication 1000 MILLIGRAM(S): at 03:14

## 2023-09-09 RX ADMIN — HEPARIN SODIUM 5000 UNIT(S): 5000 INJECTION INTRAVENOUS; SUBCUTANEOUS at 05:42

## 2023-09-09 RX ADMIN — HEPARIN SODIUM 5000 UNIT(S): 5000 INJECTION INTRAVENOUS; SUBCUTANEOUS at 14:33

## 2023-09-09 NOTE — DISCHARGE NOTE PROVIDER - NSDCFUADDINST_GEN_ALL_CORE_FT
-Please take Colace (Docusate) 100 mg once a day.   -Please follow a low fiber diet: Vegetables should initially be cooked (backed, steamed, blanched, etc.). May want to start with peeled and/or canned fruit. Limit fat/oil intake and fried/greasy foods. Add small amounts of fiber back in to the diet every couple days  -Call the office to schedule an appointment 2 weeks after surgery. The office number is listed below.    You may shower; soap and water over incision sites. Do not scrub. Pat dry when done. No tub bathing or swimming until cleared. Keep incision sites out of the sun as scars will darken. Ambulate as tolerated, but no heavy lifting (>10lbs) or strenuous exercise. You may resume regular diet. You should be urinating at least 3-4x per day. Call the office if you experience increasing abdominal pain, nausea, vomiting, or temperature >101 F.

## 2023-09-09 NOTE — DISCHARGE NOTE PROVIDER - HOSPITAL COURSE
27 y/o Mandarin speaking F seen for histoyr of recurrent perforated appendicitis w/ history of perforated appendicitis 12/2022, medically treated with IV abx. Last colonoscopy performed at [facility] with GI Dr. Aliyah Jerome on 3/15/23, small internal hemorrhoids. Tortuous colon. Otherwise unremarkable exam of the entire colon and terminal ileum.   Pt was seen again on 6/26 c/o intermittent right sided abdominal pain. CT scan and labs ordered. CT A/P performed 6/28/23 Pericecal abscess 4 cm not amenable to IR drainage and treated with Antibiotics. She presented on 9/7 for laparoscopic appendectomy w/ hemicolectomy. Pt received a laparoscopic right hemicolectomy. PT tolerated the procedure we;; and was admitted to the [facility] for postoperative care. On day of discharge patient was having regular bowel function, advanced to a low-residue diet and hemodynamically stable. Patient is to follow up with Dr. Herrera within 1-2 weeks for a postoperative follow up appointment.

## 2023-09-09 NOTE — DISCHARGE NOTE PROVIDER - CARE PROVIDER_API CALL
Ryan Herrera  Surgery  [facility] [facility]  [place] [address]  Phone: [phone]  Fax: [phone]  Follow Up Time: 1 week

## 2023-09-09 NOTE — DISCHARGE NOTE PROVIDER - NSDCCPCAREPLAN_GEN_ALL_CORE_FT
PRINCIPAL DISCHARGE DIAGNOSIS  Diagnosis: Chronic appendicitis  Assessment and Plan of Treatment:

## 2023-09-09 NOTE — PROGRESS NOTE ADULT - SUBJECTIVE AND OBJECTIVE BOX
SUBJECTIVE:      MEDICATIONS  (STANDING):  acetaminophen     Tablet .. 1000 milliGRAM(s) Oral every 6 hours  heparin   Injectable 5000 Unit(s) SubCutaneous every 8 hours  influenza   Vaccine 0.5 milliLiter(s) IntraMuscular once  ketorolac   Injectable 15 milliGRAM(s) IV Push every 6 hours  lactated ringers. 1000 milliLiter(s) (40 mL/Hr) IV Continuous <Continuous>    MEDICATIONS  (PRN):  HYDROmorphone  Injectable 0.5 milliGRAM(s) IV Push every 6 hours PRN Moderate Pain (4 - 6)  HYDROmorphone  Injectable 1 milliGRAM(s) IV Push every 6 hours PRN Severe Pain (7 - 10)      Vital Signs Last 24 Hrs  T(C): 36.9 (09 Sep 2023 05:08), Max: 37.1 (08 Sep 2023 14:19)  T(F): 98.5 (09 Sep 2023 05:08), Max: 98.8 (08 Sep 2023 14:19)  HR: 71 (09 Sep 2023 05:08) (62 - 80)  BP: 106/69 (09 Sep 2023 05:08) (93/59 - 106/73)  BP(mean): --  RR: 18 (09 Sep 2023 05:08) (16 - 19)  SpO2: 99% (09 Sep 2023 05:08) (98% - 100%)    Parameters below as of 09 Sep 2023 05:08  Patient On (Oxygen Delivery Method): room air        Physical Exam:  General: NAD, resting comfortably in bed  Pulmonary: Nonlabored breathing, no respiratory distress  Cardiovascular: NSR  Abdominal: soft, NT/ND  Extremities: WWP, normal strength  Neuro: A/O x 3, CNs II-XII grossly intact, no focal deficits    I&O's Summary    07 Sep 2023 07:01  -  08 Sep 2023 07:00  --------------------------------------------------------  IN: 1560 mL / OUT: 1463 mL / NET: 97 mL    08 Sep 2023 07:01  -  09 Sep 2023 06:30  --------------------------------------------------------  IN: 1600 mL / OUT: 1750 mL / NET: -150 mL        LABS:                        12.5   15.36 )-----------( 284      ( 08 Sep 2023 05:30 )             38.6     09-08    133<L>  |  101  |  6<L>  ----------------------------<  106<H>  4.3   |  23  |  0.49<L>    Ca    8.8      08 Sep 2023 05:30  Phos  3.9     09-08  Mg     2.1     09-08    TPro  x   /  Alb  x   /  TBili  0.5  /  DBili  x   /  AST  x   /  ALT  x   /  AlkPhos  x   09-08    PT/INR - ( 08 Sep 2023 05:30 )   PT: 11.7 sec;   INR: 1.03            Urinalysis Basic - ( 08 Sep 2023 05:30 )    Color: x / Appearance: x / SG: x / pH: x  Gluc: 106 mg/dL / Ketone: x  / Bili: x / Urobili: x   Blood: x / Protein: x / Nitrite: x   Leuk Esterase: x / RBC: x / WBC x   Sq Epi: x / Non Sq Epi: x / Bacteria: x      CAPILLARY BLOOD GLUCOSE            RADIOLOGY & ADDITIONAL STUDIES:   SUBJECTIVE:  Pt seen this AM at bedside. Pt tolerating CLD w/ consistent flatus + BM; denies nausea/vomiting.     MEDICATIONS  (STANDING):  acetaminophen     Tablet .. 1000 milliGRAM(s) Oral every 6 hours  heparin   Injectable 5000 Unit(s) SubCutaneous every 8 hours  influenza   Vaccine 0.5 milliLiter(s) IntraMuscular once  ketorolac   Injectable 15 milliGRAM(s) IV Push every 6 hours  lactated ringers. 1000 milliLiter(s) (40 mL/Hr) IV Continuous <Continuous>    MEDICATIONS  (PRN):  HYDROmorphone  Injectable 0.5 milliGRAM(s) IV Push every 6 hours PRN Moderate Pain (4 - 6)  HYDROmorphone  Injectable 1 milliGRAM(s) IV Push every 6 hours PRN Severe Pain (7 - 10)      Vital Signs Last 24 Hrs  T(C): 36.9 (09 Sep 2023 05:08), Max: 37.1 (08 Sep 2023 14:19)  T(F): 98.5 (09 Sep 2023 05:08), Max: 98.8 (08 Sep 2023 14:19)  HR: 71 (09 Sep 2023 05:08) (62 - 80)  BP: 106/69 (09 Sep 2023 05:08) (93/59 - 106/73)  BP(mean): --  RR: 18 (09 Sep 2023 05:08) (16 - 19)  SpO2: 99% (09 Sep 2023 05:08) (98% - 100%)    Parameters below as of 09 Sep 2023 05:08  Patient On (Oxygen Delivery Method): room air        Physical Exam:  General: NAD, resting comfortably in bed  Pulmonary: Nonlabored breathing, no respiratory distress  Cardiovascular: NSR  Abdominal: soft, yasir-incisional ttp, ND  Extremities: WWP, normal strength  Neuro: A/O x 3, CNs II-XII grossly intact, no focal deficits    I&O's Summary    07 Sep 2023 07:01  -  08 Sep 2023 07:00  --------------------------------------------------------  IN: 1560 mL / OUT: 1463 mL / NET: 97 mL    08 Sep 2023 07:01  -  09 Sep 2023 06:30  --------------------------------------------------------  IN: 1600 mL / OUT: 1750 mL / NET: -150 mL        LABS:                        12.5   15.36 )-----------( 284      ( 08 Sep 2023 05:30 )             38.6     09-08    133<L>  |  101  |  6<L>  ----------------------------<  106<H>  4.3   |  23  |  0.49<L>    Ca    8.8      08 Sep 2023 05:30  Phos  3.9     09-08  Mg     2.1     09-08    TPro  x   /  Alb  x   /  TBili  0.5  /  DBili  x   /  AST  x   /  ALT  x   /  AlkPhos  x   09-08    PT/INR - ( 08 Sep 2023 05:30 )   PT: 11.7 sec;   INR: 1.03            Urinalysis Basic - ( 08 Sep 2023 05:30 )    Color: x / Appearance: x / SG: x / pH: x  Gluc: 106 mg/dL / Ketone: x  / Bili: x / Urobili: x   Blood: x / Protein: x / Nitrite: x   Leuk Esterase: x / RBC: x / WBC x   Sq Epi: x / Non Sq Epi: x / Bacteria: x      CAPILLARY BLOOD GLUCOSE            RADIOLOGY & ADDITIONAL STUDIES:

## 2023-09-09 NOTE — DISCHARGE NOTE NURSING/CASE MANAGEMENT/SOCIAL WORK - PATIENT PORTAL LINK FT
You can access the FollowMyHealth Patient Portal offered by [facility] by registering at the following website: [email or web address]. By joining [facility]’s FollowMyHealth portal, you will also be able to view your health information using other applications (apps) compatible with our system.

## 2023-09-09 NOTE — PROGRESS NOTE ADULT - ASSESSMENT
28F, Mandarin speaking, w/ hx of admison for perforated appendicitis wiht abscess in Dec 2022 (treated with abx as IR could not drain the 4cm abscess found on scan) presents for elective interval appendectomy, now s/p laparoscopic right hemicolectomy    Pain/nausea control  LRD  SQH/SCDs

## 2023-09-15 DIAGNOSIS — K36 OTHER APPENDICITIS: ICD-10-CM

## 2023-09-15 LAB — SURGICAL PATHOLOGY STUDY: SIGNIFICANT CHANGE UP

## 2023-09-22 ENCOUNTER — APPOINTMENT (OUTPATIENT)
Dept: COLORECTAL SURGERY | Facility: CLINIC | Age: 28
End: 2023-09-22
Payer: COMMERCIAL

## 2023-09-22 VITALS
TEMPERATURE: 98.4 F | BODY MASS INDEX: 21.9 KG/M2 | HEIGHT: 61 IN | HEART RATE: 94 BPM | SYSTOLIC BLOOD PRESSURE: 96 MMHG | WEIGHT: 116 LBS | DIASTOLIC BLOOD PRESSURE: 63 MMHG

## 2023-09-22 DIAGNOSIS — K35.32 ACUTE APPENDICITIS W/ PERFORATION AND LOCALIZED PERITONITIS, W/O ABSCESS: ICD-10-CM

## 2023-09-22 PROCEDURE — 99024 POSTOP FOLLOW-UP VISIT: CPT
